# Patient Record
Sex: MALE | Race: ASIAN | Employment: FULL TIME | URBAN - METROPOLITAN AREA
[De-identification: names, ages, dates, MRNs, and addresses within clinical notes are randomized per-mention and may not be internally consistent; named-entity substitution may affect disease eponyms.]

---

## 2017-03-01 ENCOUNTER — OFFICE VISIT (OUTPATIENT)
Dept: FAMILY MEDICINE | Facility: CLINIC | Age: 47
End: 2017-03-01

## 2017-03-01 VITALS
BODY MASS INDEX: 25.16 KG/M2 | DIASTOLIC BLOOD PRESSURE: 90 MMHG | OXYGEN SATURATION: 98 % | HEART RATE: 88 BPM | WEIGHT: 147.4 LBS | SYSTOLIC BLOOD PRESSURE: 132 MMHG | TEMPERATURE: 98.2 F | HEIGHT: 64 IN

## 2017-03-01 DIAGNOSIS — R05.9 COUGH: Primary | ICD-10-CM

## 2017-03-01 PROCEDURE — 99213 OFFICE O/P EST LOW 20 MIN: CPT | Performed by: PHYSICIAN ASSISTANT

## 2017-03-01 RX ORDER — BENZONATATE 100 MG/1
100 CAPSULE ORAL 3 TIMES DAILY PRN
Qty: 21 CAPSULE | Refills: 0 | Status: SHIPPED | OUTPATIENT
Start: 2017-03-01 | End: 2017-03-20

## 2017-03-01 NOTE — NURSING NOTE
Son HEIKE Castro is here for cough and chest congestion. Stated that he travel back for [place] last night 02/28/2017 and the cough got worse. Also pt stated that he throat hurts when swallowing and always dry mouth.    Pre-Visit Screening :  Immunizations : up to date  Colon Screening : NA  Asthma Action Test/Plan : NA  PHQ9/GAD7 :  NA

## 2017-03-01 NOTE — PROGRESS NOTES
SUBJECTIVE:                                                    Son HEIKE Castro is a 47 year old male who presents to clinic today for the following health issues:    Son  is here today because of: Cough    Returned from [place] last night.    Onset of symptoms was 1   week ago.    The patient has had symptoms of:  Cough, headache and sore throat.     Patient denies fever, nausea, vomiting and diarrhea      24 hours of diarrhea while in [place].     Course of illness is worsening.    Patient denies exposure to illness at home or work/school.     Treatment measures tried include:   APAP, Nyquil,     Patient is not a smoker    Patient does not have a history of asthma    Other Contributing factors: None    ROS:  CV: NEGATIVE for chest pain or palpitations   GI: NEGATIVE for nausea, vomiting, abdominal pain, diarrhea or constipation  : negative for dysuria, hematuria        BP Readings from Last 3 Encounters:   03/01/17 132/90   04/08/15 130/80   02/25/14 138/88    Wt Readings from Last 3 Encounters:   03/01/17 66.9 kg (147 lb 6.4 oz)   04/08/15 67.9 kg (149 lb 12.8 oz)   02/25/14 67.1 kg (148 lb)                  Patient Active Problem List   Diagnosis     Calculus of kidney     Other specified urticaria     Allergic rhinitis     Hyperlipemia     CARDIOVASCULAR SCREENING; LDL GOAL LESS THAN 160     Health Care Home     Past Surgical History   Procedure Laterality Date     C repr cleft lip/nasal,secondary  1991       Social History   Substance Use Topics     Smoking status: Former Smoker     Years: 5.00     Quit date: 1/1/1990     Smokeless tobacco: Never Used     Alcohol use 0.5 oz/week     1 drink(s) per week      Comment: Maybe couple x per year     Family History   Problem Relation Age of Onset     C.A.D. No family hx of      DIABETES No family hx of      Hypertension Mother      CANCER No family hx of      CEREBROVASCULAR DISEASE No family hx of          Current Outpatient Prescriptions   Medication Sig Dispense  "Refill     benzonatate (TESSALON) 100 MG capsule Take 1 capsule (100 mg) by mouth 3 times daily as needed for cough 21 capsule 0     fluticasone (FLONASE) 50 MCG/ACT nasal spray SPRAY 2 SPRAYS INTO BOTH NOSTRILS DAILY 16 g 1     fexofenadine (ALLEGRA) 180 MG tablet Take 1 tablet (180 mg) by mouth daily 90 tablet 3     olopatadine (PATANOL) 0.1 % ophthalmic solution Apply 2 drops to eye daily 5 mL 11     Acetaminophen (TYLENOL 8 HOUR PO) Take  by mouth.       Allergies   Allergen Reactions     No Known Allergies        OBJECTIVE:                                                    /90 (BP Location: Right arm, Patient Position: Chair, Cuff Size: Adult Regular)  Pulse 88  Temp 98.2  F (36.8  C) (Oral)  Ht 1.626 m (5' 4\")  Wt 66.9 kg (147 lb 6.4 oz)  SpO2 98%  BMI 25.3 kg/m2   Body mass index is 25.3 kg/(m^2).     GENERAL: healthy, alert and no distress  EYES:Lids and Conjunctival normal, no discharge present bilaterally  EARS:   Right: CANAL and TM is normal: no effusions, no erythema, and normal landmarks  Left: CANAL and TM is normal: no effusions, no erythema, and normal landmarks  NOSE: normal  OROPHARYNX: normal, no tonsillar hypertrophy and no exudates present  NECK: normal, supple and no adenopathy  HEART: regular rate and rhythm; no murmurs, clicks, or gallops  LUNGS: CTA bilaterally  SKIN:Warm, Dry and No Rash           ASSESSMENT/PLAN:                                                          P:     1. Cough  - benzonatate (TESSALON) 100 MG capsule; Take 1 capsule (100 mg) by mouth 3 times daily as needed for cough  Dispense: 21 capsule; Refill: 0    Benzonatate prescribed. Take tid for 7 days.  AE include: constipation, dizziness, sleepiness, nasal congestion, upset stomach and headache.       Symptomatic cares advised including Increase fluids, rest, acetominophen or ibuprofen prn if not contraindicated.     If applicable advised the following:  Sore throat:  sugar free throat lozenges, sprays, " Chloraseptic lozenges, salt water gargles  Cough: sugar free cough drops, honey, Delsym bid.    Sinus: Warm packs on face, Vicks Vapo Rub, Sudafed (if HTN well controlled)      The patient is to RTC prn if these symptoms worsen or fail to improve as anticipated.       Svetlana Blackman PA-C  3/1/2017

## 2017-03-01 NOTE — MR AVS SNAPSHOT
"              After Visit Summary   3/1/2017    Tj Castro    MRN: [id number]           Patient Information     Date Of Birth          1970        Visit Information        Provider Department      3/1/2017 12:30 PM Svetlana Blackman PA [facility]        Today's Diagnoses     Cough    -  1       Follow-ups after your visit        Who to contact     If you have questions or need follow up information about today's clinic visit or your schedule please contact [facility] directly at [phone].  Normal or non-critical lab and imaging results will be communicated to you by Clickyreservahart, letter or phone within 4 business days after the clinic has received the results. If you do not hear from us within 7 days, please contact the clinic through Clickyreservahart or phone. If you have a critical or abnormal lab result, we will notify you by phone as soon as possible.  Submit refill requests through [employer] or call your pharmacy and they will forward the refill request to us. Please allow 3 business days for your refill to be completed.          Additional Information About Your Visit        MyChart Information     [employer]cert lets you send messages to your doctor, view your test results, renew your prescriptions, schedule appointments and more. To sign up, go to [email or web address] . Click on \"Log in\" on the left side of the screen, which will take you to the Welcome page. Then click on \"Sign up Now\" on the right side of the page.     You will be asked to enter the access code listed below, as well as some personal information. Please follow the directions to create your username and password.     Your access code is: [email or web address]  Expires: 2017  1:04 PM     Your access code will  in 90 days. If you need help or a new code, please call your [place] clinic or [phone].        Care EveryWhere ID     This is your Care EveryWhere ID. This could be used by other " "organizations to access your [place] medical records  [id number]        Your Vitals Were     Pulse Temperature Height Pulse Oximetry BMI (Body Mass Index)       88 98.2  F (36.8  C) (Oral) 1.626 m (5' 4\") 98% 25.3 kg/m2        Blood Pressure from Last 3 Encounters:   03/01/17 132/90   04/08/15 130/80   02/25/14 138/88    Weight from Last 3 Encounters:   03/01/17 66.9 kg (147 lb 6.4 oz)   04/08/15 67.9 kg (149 lb 12.8 oz)   02/25/14 67.1 kg (148 lb)              Today, you had the following     No orders found for display         Today's Medication Changes          These changes are accurate as of: 3/1/17  1:04 PM.  If you have any questions, ask your nurse or doctor.               Start taking these medicines.        Dose/Directions    benzonatate 100 MG capsule   Commonly known as:  TESSALON   Used for:  Cough   Started by:  Svetlana Blackman PA        Dose:  100 mg   Take 1 capsule (100 mg) by mouth 3 times daily as needed for cough   Quantity:  21 capsule   Refills:  0            Where to get your medicines      These medications were sent to [id number] - [place], [place] - [address] AT [place] [address]  [address] [place] [address]     Phone:  [phone]     benzonatate 100 MG capsule                Primary Care Provider Office Phone # Fax #    Willian Winters [phone]       [facility] [address] [facility] [address]  [facility] [address]        Thank you!     Thank you for choosing [facility]  for your care. Our goal is always to provide you with excellent care. Hearing back from our patients is one way we can continue to improve our services. Please take a few minutes to complete the written survey that you may receive in the mail after your visit with us. Thank you!             Your Updated Medication List - Protect others around you: Learn how to safely use, store and throw away your medicines at " www.disposemymeds.org.          This list is accurate as of: 3/1/17  1:04 PM.  Always use your most recent med list.                   Brand Name Dispense Instructions for use    benzonatate 100 MG capsule    TESSALON    21 capsule    Take 1 capsule (100 mg) by mouth 3 times daily as needed for cough       fexofenadine 180 MG tablet    ALLEGRA    90 tablet    Take 1 tablet (180 mg) by mouth daily       fluticasone 50 MCG/ACT spray    FLONASE    16 g    SPRAY 2 SPRAYS INTO BOTH NOSTRILS DAILY       olopatadine 0.1 % ophthalmic solution    PATANOL    5 mL    Apply 2 drops to eye daily       TYLENOL 8 HOUR PO      Take  by mouth.

## 2017-03-20 ENCOUNTER — OFFICE VISIT (OUTPATIENT)
Dept: FAMILY MEDICINE | Facility: CLINIC | Age: 47
End: 2017-03-20

## 2017-03-20 VITALS
OXYGEN SATURATION: 99 % | HEART RATE: 85 BPM | HEIGHT: 63 IN | DIASTOLIC BLOOD PRESSURE: 104 MMHG | BODY MASS INDEX: 25.69 KG/M2 | SYSTOLIC BLOOD PRESSURE: 130 MMHG | WEIGHT: 145 LBS | TEMPERATURE: 98.6 F | RESPIRATION RATE: 28 BRPM

## 2017-03-20 DIAGNOSIS — R05.9 COUGH: Primary | ICD-10-CM

## 2017-03-20 DIAGNOSIS — Z71.89 ACP (ADVANCE CARE PLANNING): ICD-10-CM

## 2017-03-20 PROCEDURE — 99213 OFFICE O/P EST LOW 20 MIN: CPT | Performed by: FAMILY MEDICINE

## 2017-03-20 RX ORDER — AZITHROMYCIN 250 MG/1
TABLET, FILM COATED ORAL
Qty: 6 TABLET | Refills: 0 | Status: SHIPPED | OUTPATIENT
Start: 2017-03-20 | End: 2017-05-22

## 2017-03-20 NOTE — PROGRESS NOTES
"SUBJECTIVE:   Son HEIKE Castro is a 47 year old male who complains of cough, productive cough and chest congestion for 21 days-started after return from trip to [place]. He denies a history of sweats, myalgias, shortness of breath and chest pain and denies a history of asthma. Patient does not smoke cigarettes.    Pt was seen here 3/1/17 and tried on tessalon-not resolving but not a lot worse     Pt taking nyquil    OBJECTIVE:BP (!) 130/104 (BP Location: Left arm, Patient Position: Chair, Cuff Size: Adult Large)  Pulse 85  Temp 98.6  F (37  C) (Oral)  Resp 28  Ht 1.607 m (5' 3.25\")  Wt 65.8 kg (145 lb)  SpO2 99%  BMI 25.48 kg/m2   He appears well, vital signs are as noted by the nurse. Ears normal.  Throat and pharynx normal.  Neck supple. No adenopathy in the neck. Nose is congested. Sinuses non tender. The chest is clear, without wheezes or rales.    ASSESSMENT:   Cough-now on week 4    PLAN:Zpack,  Discussed viral vs. bacterial infections and need to avoid unnecessary prescribing of antibiotics to ensure that no resistance will develop. Will give prescription for antibiotic (see orders) to fill is symptoms do not improve in the next 2-3 days.   Symptomatic therapy suggested: push fluids and rest. Call or return to clinic prn if these symptoms worsen or fail to improve as anticipated.   "

## 2017-05-22 ENCOUNTER — OFFICE VISIT (OUTPATIENT)
Dept: FAMILY MEDICINE | Facility: CLINIC | Age: 47
End: 2017-05-22

## 2017-05-22 VITALS
WEIGHT: 148.8 LBS | TEMPERATURE: 98.5 F | SYSTOLIC BLOOD PRESSURE: 132 MMHG | BODY MASS INDEX: 26.36 KG/M2 | HEIGHT: 63 IN | RESPIRATION RATE: 20 BRPM | HEART RATE: 90 BPM | OXYGEN SATURATION: 98 % | DIASTOLIC BLOOD PRESSURE: 98 MMHG

## 2017-05-22 DIAGNOSIS — R03.0 ELEVATED BLOOD-PRESSURE READING WITHOUT DIAGNOSIS OF HYPERTENSION: ICD-10-CM

## 2017-05-22 DIAGNOSIS — Z63.5 MARITAL DISRUPTION INVOLVING DIVORCE: ICD-10-CM

## 2017-05-22 DIAGNOSIS — R07.0 THROAT PAIN: Primary | ICD-10-CM

## 2017-05-22 DIAGNOSIS — J30.1 SEASONAL ALLERGIC RHINITIS DUE TO POLLEN: ICD-10-CM

## 2017-05-22 LAB — S PYO AG THROAT QL IA.RAPID: NORMAL

## 2017-05-22 PROCEDURE — 99213 OFFICE O/P EST LOW 20 MIN: CPT | Performed by: FAMILY MEDICINE

## 2017-05-22 PROCEDURE — 87880 STREP A ASSAY W/OPTIC: CPT | Performed by: FAMILY MEDICINE

## 2017-05-22 PROCEDURE — 87070 CULTURE OTHR SPECIMN AEROBIC: CPT | Performed by: FAMILY MEDICINE

## 2017-05-22 RX ORDER — FLUTICASONE PROPIONATE 50 MCG
SPRAY, SUSPENSION (ML) NASAL
Qty: 16 G | Refills: 3 | Status: SHIPPED | OUTPATIENT
Start: 2017-05-22 | End: 2018-03-21

## 2017-05-22 SDOH — SOCIAL STABILITY - SOCIAL INSECURITY: DISRUPTION OF FAMILY BY SEPARATION AND DIVORCE: Z63.5

## 2017-05-22 ASSESSMENT — ANXIETY QUESTIONNAIRES
GAD7 TOTAL SCORE: 2
6. BECOMING EASILY ANNOYED OR IRRITABLE: NOT AT ALL
1. FEELING NERVOUS, ANXIOUS, OR ON EDGE: NOT AT ALL
2. NOT BEING ABLE TO STOP OR CONTROL WORRYING: SEVERAL DAYS
5. BEING SO RESTLESS THAT IT IS HARD TO SIT STILL: NOT AT ALL
3. WORRYING TOO MUCH ABOUT DIFFERENT THINGS: SEVERAL DAYS
IF YOU CHECKED OFF ANY PROBLEMS ON THIS QUESTIONNAIRE, HOW DIFFICULT HAVE THESE PROBLEMS MADE IT FOR YOU TO DO YOUR WORK, TAKE CARE OF THINGS AT HOME, OR GET ALONG WITH OTHER PEOPLE: SOMEWHAT DIFFICULT
7. FEELING AFRAID AS IF SOMETHING AWFUL MIGHT HAPPEN: NOT AT ALL

## 2017-05-22 ASSESSMENT — PATIENT HEALTH QUESTIONNAIRE - PHQ9: 5. POOR APPETITE OR OVEREATING: NOT AT ALL

## 2017-05-22 NOTE — NURSING NOTE
Son HEIKE Castro is here today for URI symptoms: Sore Throat, Body Aches, Headaches, Sinus Pressure/Pain, and Ear Pain.    Pre-Visit Screening :  Immunizations : up to date  Colon Screening : NA  Asthma Action Test/Plan : NA  PHQ9/GAD7 :  Completed Today  Pulse - regular  My Chart - declines    CLASSIFICATION OF OVERWEIGHT AND OBESITY BY BMI                         Obesity Class           BMI(kg/m2)  Underweight                                    < 18.5  Normal                                         18.5-24.9  Overweight                                     25.0-29.9  OBESITY                     I                  30.0-34.9                              II                 35.0-39.9  EXTREME OBESITY             III                >40                             Patient's  BMI Body mass index is 26.36 kg/(m^2).  http://hin.nhlbi.nih.gov/menuplanner/menu.cgi  Questioned patient about current smoking habits.  Pt. quit smoking some time ago.  Rita Briscoe, [facility]

## 2017-05-22 NOTE — PROGRESS NOTES
SUBJECTIVE:   Son HEIKE Castro is a 47 year old male who complains of headache, sneezing, sore throat, earache, dry cough and myalgias for 14 days. He denies a history of productive cough, sweats, chills, wheezing, shortness of breath and chest pain and denies a history of asthma. Patient does not smoke cigarettes.    Pt checking BP at home-always normal but under stress related to divorce    Patient Active Problem List   Diagnosis     Calculus of kidney     Other specified urticaria     Allergic rhinitis     Hyperlipemia     CARDIOVASCULAR SCREENING; LDL GOAL LESS THAN 160     Health Care Home     ACP (advance care planning)     Past Medical History:   Diagnosis Date     Allergic rhinitis, cause unspecified     trees and pollens per testing     Hyperlipemia      Nephrolithiasis      Family History   Problem Relation Age of Onset     C.A.D. No family hx of      DIABETES No family hx of      Hypertension Mother      CANCER No family hx of      CEREBROVASCULAR DISEASE No family hx of      Social History     Social History     Marital status:      Spouse name: N/A     Number of children: N/A     Years of education: N/A     Occupational History     Maintenance [employer]     Social History Main Topics     Smoking status: Former Smoker     Years: 5.00     Quit date: 1/1/1990     Smokeless tobacco: Never Used     Alcohol use 0.5 oz/week     1 drink(s) per week      Comment: Maybe couple x per year     Drug use: No     Sexual activity: Yes     Partners: Female     Other Topics Concern     Occupational Exposure No     Exercise Yes     Social History Narrative     Past Surgical History:   Procedure Laterality Date     C REPR CLEFT LIP/NASAL,SECONDARY  1991       Current Outpatient Prescriptions on File Prior to Visit:  fexofenadine (ALLEGRA) 180 MG tablet Take 1 tablet (180 mg) by mouth daily   fluticasone (FLONASE) 50 MCG/ACT nasal spray SPRAY 2 SPRAYS INTO BOTH NOSTRILS DAILY   olopatadine (PATANOL) 0.1 % ophthalmic  "solution Apply 2 drops to eye daily   Acetaminophen (TYLENOL 8 HOUR PO) Take  by mouth.     No current facility-administered medications on file prior to visit.      Allergies: No known allergies    Immunization History   Administered Date(s) Administered     Hepatitis A Vac Ped/Adol-2 Dose 09/18/1999, 09/25/2007     OPV 09/18/1999     TD (ADULT, 7+) 09/18/1999     TDAP Vaccine (Adacel) 04/24/2013     Typhoid IM 09/18/1999     Typhoid Oral 09/25/2007         OBJECTIVE:BP (!) 132/98  Pulse 90  Temp 98.5  F (36.9  C) (Oral)  Resp 20  Ht 1.6 m (5' 3\")  Wt 67.5 kg (148 lb 12.8 oz)  SpO2 98%  BMI 26.36 kg/m2   He appears well, vital signs are as noted by the nurse. Ears normal.  Throat and pharynx normal.  Neck supple. No adenopathy in the neck. Nose is congested. Sinuses non tender. The chest is clear, without wheezes or rales.    ASSESSMENT:   Allergic rhinitis-has not been taking flonase    PLAN:restart flonase, continue allegra, I reviewed the risks, benefits, and possible side effects of the medication.  The patient had an opportunity to ask any questions regarding the treatment plan. The patient was encouraged to call my office if any problems.   Symptomatic therapy suggested: push fluids, rest and use acetaminophen, ibuprofen as needed. Call or return to clinic prn if these symptoms worsen or fail to improve as anticipated.     Check blood pressure readings outside of the clinic several times per week, write down values, and follow up if elevated within the next several weeks. Blood pressure can be checked at the firestation, drugstore,  or any valid site.   "

## 2017-05-22 NOTE — MR AVS SNAPSHOT
"              After Visit Summary   5/22/2017    Son HEIKE Castro    MRN: [id number]           Patient Information     Date Of Birth          1970        Visit Information        Provider Department      5/22/2017 3:45 PM Willian Winters MD [facility]        Today's Diagnoses     Throat pain    -  1    Elevated blood-pressure reading without diagnosis of hypertension        Marital disruption involving divorce        Seasonal allergic rhinitis due to pollen           Follow-ups after your visit        Who to contact     If you have questions or need follow up information about today's clinic visit or your schedule please contact [facility] directly at [phone].  Normal or non-critical lab and imaging results will be communicated to you by MyChart, letter or phone within 4 business days after the clinic has received the results. If you do not hear from us within 7 days, please contact the clinic through MyChart or phone. If you have a critical or abnormal lab result, we will notify you by phone as soon as possible.  Submit refill requests through "[employer] or call your pharmacy and they will forward the refill request to us. Please allow 3 business days for your refill to be completed.          Additional Information About Your Visit        Care EveryWhere ID     This is your Care EveryWhere ID. This could be used by other organizations to access your [place] medical records  [id number]        Your Vitals Were     Pulse Temperature Respirations Height Pulse Oximetry BMI (Body Mass Index)    90 98.5  F (36.9  C) (Oral) 20 1.6 m (5' 3\") 98% 26.36 kg/m2       Blood Pressure from Last 3 Encounters:   05/22/17 (!) 132/98   03/20/17 (!) 130/104   03/01/17 132/90    Weight from Last 3 Encounters:   05/22/17 67.5 kg (148 lb 12.8 oz)   03/20/17 65.8 kg (145 lb)   03/01/17 66.9 kg (147 lb 6.4 oz)              We Performed the Following     Rapid strep screen     " THROAT CULTURE (BFP)          Today's Medication Changes          These changes are accurate as of: 5/22/17  4:02 PM.  If you have any questions, ask your nurse or doctor.               These medicines have changed or have updated prescriptions.        Dose/Directions    fluticasone 50 MCG/ACT spray   Commonly known as:  FLONASE   This may have changed:  See the new instructions.   Used for:  Seasonal allergic rhinitis due to pollen   Changed by:  Willian Winters MD        SPRAY 2 SPRAYS INTO BOTH NOSTRILS DAILY   Quantity:  16 g   Refills:  3            Where to get your medicines      These medications were sent to [facility] [address]  [place], [place] - [address] AT [place] [address]  [address], [place] [address]     Phone:  [phone]     fluticasone 50 MCG/ACT spray                Primary Care Provider Office Phone # Fax #    Willian Winters [phone]       [facility] [address]  [facility] [address]        Thank you!     Thank you for choosing [facility]  for your care. Our goal is always to provide you with excellent care. Hearing back from our patients is one way we can continue to improve our services. Please take a few minutes to complete the written survey that you may receive in the mail after your visit with us. Thank you!             Your Updated Medication List - Protect others around you: Learn how to safely use, store and throw away your medicines at www.disposemymeds.org.          This list is accurate as of: 5/22/17  4:02 PM.  Always use your most recent med list.                   Brand Name Dispense Instructions for use    fexofenadine 180 MG tablet    ALLEGRA    90 tablet    Take 1 tablet (180 mg) by mouth daily       fluticasone 50 MCG/ACT spray    FLONASE    16 g    SPRAY 2 SPRAYS INTO BOTH NOSTRILS DAILY       olopatadine 0.1 % ophthalmic solution    PATANOL    5 mL     Apply 2 drops to eye daily       TYLENOL 8 HOUR PO      Take  by mouth.

## 2017-05-23 ASSESSMENT — ANXIETY QUESTIONNAIRES: GAD7 TOTAL SCORE: 2

## 2017-05-24 LAB — THROAT CULTURE: NORMAL

## 2018-01-17 ENCOUNTER — OFFICE VISIT (OUTPATIENT)
Dept: FAMILY MEDICINE | Facility: CLINIC | Age: 48
End: 2018-01-17

## 2018-01-17 VITALS
TEMPERATURE: 98.6 F | HEART RATE: 81 BPM | SYSTOLIC BLOOD PRESSURE: 142 MMHG | OXYGEN SATURATION: 100 % | BODY MASS INDEX: 26.82 KG/M2 | WEIGHT: 151.4 LBS | DIASTOLIC BLOOD PRESSURE: 90 MMHG

## 2018-01-17 DIAGNOSIS — Z71.1 NO ABNORMALITY DETECTED ON MENTAL HEALTH ASSESSMENT: Primary | ICD-10-CM

## 2018-01-17 PROCEDURE — 99213 OFFICE O/P EST LOW 20 MIN: CPT | Performed by: FAMILY MEDICINE

## 2018-01-17 NOTE — NURSING NOTE
Son is here for paperwork to get girlfriend here from [place].  BP elevated but he thinks it d/t him worrying about everything    Pre-visit Screening:  Immunizations:  up to date  Colonoscopy:  NA  Mammogram: NA  Asthma Action Test/Plan:  NA  PHQ9:  NA  GAD7:  NA  Questioned patient about current smoking habits Pt. quit smoking some time ago.  Ok to leave detailed message on voice mail for today's visit only Yes, phone # [phone]

## 2018-01-17 NOTE — PROGRESS NOTES
SUBJECTIVE:  Son HEIKE Castro, a 48 year old male scheduled an appointment to discuss the following issues:  Health Care Home  Pt here for form completion-he is getting remarried to a woman from his home country and the government requires a medical professional to attest to his mental health.  Pt has never been treated for or diagnosed with mental illness. He has not symptoms of depression or anxiety.    Medical, social, surgical, and family histories reviewed.    Patient Active Problem List   Diagnosis     Calculus of kidney     Other specified urticaria     Allergic rhinitis     Hyperlipemia     CARDIOVASCULAR SCREENING; LDL GOAL LESS THAN 160     Health Care Home     ACP (advance care planning)     Past Medical History:   Diagnosis Date     Allergic rhinitis, cause unspecified     trees and pollens per testing     Hyperlipemia      Nephrolithiasis      Family History   Problem Relation Age of Onset     C.A.D. No family hx of      DIABETES No family hx of      Hypertension Mother      CANCER No family hx of      CEREBROVASCULAR DISEASE No family hx of      Social History     Social History     Marital status:      Spouse name: N/A     Number of children: N/A     Years of education: N/A     Occupational History     Maintenance [employer]     Social History Main Topics     Smoking status: Former Smoker     Years: 5.00     Quit date: 1/1/1990     Smokeless tobacco: Never Used     Alcohol use 0.5 oz/week     1 drink(s) per week      Comment: Maybe couple x per year     Drug use: No     Sexual activity: Yes     Partners: Female     Other Topics Concern     Occupational Exposure No     Exercise Yes     Social History Narrative     Past Surgical History:   Procedure Laterality Date     C REPR CLEFT LIP/NASAL,SECONDARY  1991       Current Outpatient Prescriptions on File Prior to Visit:  fluticasone (FLONASE) 50 MCG/ACT spray SPRAY 2 SPRAYS INTO BOTH NOSTRILS DAILY   fexofenadine (ALLEGRA) 180 MG tablet Take 1 tablet  (180 mg) by mouth daily   olopatadine (PATANOL) 0.1 % ophthalmic solution Apply 2 drops to eye daily   Acetaminophen (TYLENOL 8 HOUR PO) Take  by mouth.     No current facility-administered medications on file prior to visit.      Allergies: No known allergies    Immunization History   Administered Date(s) Administered     HEPA 09/18/1999, 09/25/2007     OPV, trivalent, live 09/18/1999     TD (ADULT, 7+) 09/18/1999     TDAP Vaccine (Adacel) 04/24/2013     Typhoid IM 09/18/1999     Typhoid Oral 09/25/2007        ROS:  C: NEGATIVE for fever, chills  E: NEGATIVE for vision changes   R: NEGATIVE for significant cough or SOB  CV: NEGATIVE for chest pain, palpitations   GI: NEGATIVE for nausea, abdominal pain, heartburn, or change in bowel habits  : NEGATIVE for frequency, dysuria, or hematuria  M: NEGATIVE for significant arthralgias or myalgia  N: NEGATIVE for weakness, dizziness or paresthesias or headache    OBJECTIVE:  /90 (BP Location: Left arm, Patient Position: Chair, Cuff Size: Adult Large)  Pulse 81  Temp 98.6  F (37  C) (Oral)  Wt 68.7 kg (151 lb 6.4 oz)  SpO2 100%  BMI 26.82 kg/m2  EXAM:  GENERAL APPEARANCE: healthy, alert and no distress  PSYCH: mentation appears normal and affect normal/bright    ASSESSMENT/PLAN:  (Z71.1) No abnormality detected on mental health assessment  (primary encounter diagnosis)  Comment: forms completed and notarized  Plan:

## 2018-01-17 NOTE — MR AVS SNAPSHOT
"              After Visit Summary   2018    Tj Castro    MRN: [id number]           Patient Information     Date Of Birth          1970        Visit Information        Provider Department      2018 3:30 PM Willian Winters MD [facility]        Today's Diagnoses     No abnormality detected on mental health assessment    -  1       Follow-ups after your visit        Who to contact     If you have questions or need follow up information about today's clinic visit or your schedule please contact [facility] directly at [phone].  Normal or non-critical lab and imaging results will be communicated to you by [employer], letter or phone within 4 business days after the clinic has received the results. If you do not hear from us within 7 days, please contact the clinic through [employer] or phone. If you have a critical or abnormal lab result, we will notify you by phone as soon as possible.  Submit refill requests through [employer] or call your pharmacy and they will forward the refill request to us. Please allow 3 business days for your refill to be completed.          Additional Information About Your Visit        MyChart Information     [employer] lets you send messages to your doctor, view your test results, renew your prescriptions, schedule appointments and more. To sign up, go to [email or web address] . Click on \"Log in\" on the left side of the screen, which will take you to the Welcome page. Then click on \"Sign up Now\" on the right side of the page.     You will be asked to enter the access code listed below, as well as some personal information. Please follow the directions to create your username and password.     Your access code is: [email or web address]  Expires: 2018  4:11 PM     Your access code will  in 90 days. If you need help or a new code, please call your [facility] clinic or [phone].        Care EveryWhere ID     This is your " Care EveryWhere ID. This could be used by other organizations to access your [facility] medical records  [id number]        Your Vitals Were     Pulse Temperature Pulse Oximetry BMI (Body Mass Index)          81 98.6  F (37  C) (Oral) 100% 26.82 kg/m2         Blood Pressure from Last 3 Encounters:   01/17/18 142/90   05/22/17 (!) 132/98   03/20/17 (!) 130/104    Weight from Last 3 Encounters:   01/17/18 68.7 kg (151 lb 6.4 oz)   05/22/17 67.5 kg (148 lb 12.8 oz)   03/20/17 65.8 kg (145 lb)              Today, you had the following     No orders found for display       Primary Care Provider Office Phone # Fax #    Willian Winters [phone] [address] [facility] [address]  [facility] [address]        Equal Access to Services     ANTOLIN MCCOY : Hadii aad ku hadasho Soomaali, waaxda luqadaha, qaybta kaalmada adeegyada, waxay marisain haydeannn eda gallardo . So [facility] [phone].    ATENCIÓN: Si habla español, tiene a duff disposición servicios gratuitos de asistencia lingüística. Rui al [phone].    We comply with applicable federal civil rights laws and [place] laws. We do not discriminate on the basis of race, color, national origin, age, disability, sex, sexual orientation, or gender identity.            Thank you!     Thank you for choosing [facility]  for your care. Our goal is always to provide you with excellent care. Hearing back from our patients is one way we can continue to improve our services. Please take a few minutes to complete the written survey that you may receive in the mail after your visit with us. Thank you!             Your Updated Medication List - Protect others around you: Learn how to safely use, store and throw away your medicines at www.disposemymeds.org.          This list is accurate as of: 1/17/18  4:11 PM.  Always use your most recent med list.                   Brand Name Dispense Instructions for use Diagnosis     fexofenadine 180 MG tablet    ALLEGRA    90 tablet    Take 1 tablet (180 mg) by mouth daily    Allergic rhinitis, cause unspecified       fluticasone 50 MCG/ACT spray    FLONASE    16 g    SPRAY 2 SPRAYS INTO BOTH NOSTRILS DAILY    Seasonal allergic rhinitis due to pollen       olopatadine 0.1 % ophthalmic solution    PATANOL    5 mL    Apply 2 drops to eye daily    Allergic rhinitis, cause unspecified       TYLENOL 8 HOUR PO      Take  by mouth.

## 2018-03-05 ENCOUNTER — OFFICE VISIT (OUTPATIENT)
Dept: FAMILY MEDICINE | Facility: CLINIC | Age: 48
End: 2018-03-05

## 2018-03-05 VITALS
SYSTOLIC BLOOD PRESSURE: 136 MMHG | BODY MASS INDEX: 26.89 KG/M2 | OXYGEN SATURATION: 98 % | DIASTOLIC BLOOD PRESSURE: 96 MMHG | WEIGHT: 151.8 LBS | HEART RATE: 87 BPM | TEMPERATURE: 98.2 F

## 2018-03-05 DIAGNOSIS — R53.83 FATIGUE, UNSPECIFIED TYPE: ICD-10-CM

## 2018-03-05 DIAGNOSIS — R03.0 ELEVATED BLOOD PRESSURE READING WITHOUT DIAGNOSIS OF HYPERTENSION: ICD-10-CM

## 2018-03-05 DIAGNOSIS — M25.50 PAIN IN JOINT, MULTIPLE SITES: Primary | ICD-10-CM

## 2018-03-05 DIAGNOSIS — Z71.84 TRAVEL ADVICE ENCOUNTER: ICD-10-CM

## 2018-03-05 LAB
% GRANULOCYTES: 61.9 %
ERYTHROCYTE [SEDIMENTATION RATE] IN BLOOD: 5 MM/HR (ref 0–20)
HCT VFR BLD AUTO: 49.4 % (ref 40–53)
HEMOGLOBIN: 16.4 G/DL (ref 13.3–17.7)
LYMPHOCYTES NFR BLD AUTO: 28.8 %
MCH RBC QN AUTO: 26.1 PG (ref 26–33)
MCHC RBC AUTO-ENTMCNC: 33.2 G/DL (ref 31–36)
MCV RBC AUTO: 78.6 FL (ref 78–100)
MONOCYTES NFR BLD AUTO: 9.3 %
PLATELET COUNT - QUEST: 403 10^9/L (ref 150–375)
RBC # BLD AUTO: 6.28 10*12/L (ref 4.4–5.9)
WBC # BLD AUTO: 7.2 10*9/L (ref 4–11)

## 2018-03-05 PROCEDURE — 86431 RHEUMATOID FACTOR QUANT: CPT | Mod: 90 | Performed by: FAMILY MEDICINE

## 2018-03-05 PROCEDURE — 86618 LYME DISEASE ANTIBODY: CPT | Mod: 90 | Performed by: FAMILY MEDICINE

## 2018-03-05 PROCEDURE — 36415 COLL VENOUS BLD VENIPUNCTURE: CPT | Performed by: FAMILY MEDICINE

## 2018-03-05 PROCEDURE — 80050 GENERAL HEALTH PANEL: CPT | Mod: 90 | Performed by: FAMILY MEDICINE

## 2018-03-05 PROCEDURE — 86038 ANTINUCLEAR ANTIBODIES: CPT | Mod: 90 | Performed by: FAMILY MEDICINE

## 2018-03-05 PROCEDURE — 99214 OFFICE O/P EST MOD 30 MIN: CPT | Performed by: FAMILY MEDICINE

## 2018-03-05 PROCEDURE — 85651 RBC SED RATE NONAUTOMATED: CPT | Performed by: FAMILY MEDICINE

## 2018-03-05 RX ORDER — CIPROFLOXACIN 500 MG/1
500 TABLET, FILM COATED ORAL 2 TIMES DAILY
Qty: 6 TABLET | Refills: 0 | Status: SHIPPED | OUTPATIENT
Start: 2018-03-05 | End: 2018-03-21

## 2018-03-05 NOTE — LETTER
March 7, 2018      Tj Castro  [address] [place] [address]        Dear ,    We are writing to inform you of your test results.    Your test results fall within the expected range(s).  Looks good from labs!!    I think we may need to discuss mood treatments.  Please follow up with me if your symptoms persist.    Resulted Orders   CL AFF HEMOGRAM/PLATE/DIFF (BFP)   Result Value Ref Range    WBC 7.2 4.0 - 11 10*9/L      Comment:      ran twice ea    RBC Count 6.28 (A) 4.4 - 5.9 10*12/L    Hemoglobin 16.4 13.3 - 17.7 g/dL    Hematocrit 49.4 40.0 - 53.0 %    MCV 78.6 78 - 100 fL    MCH 26.1 26 - 33 pg    MCHC 33.2 31 - 36 g/dL    Platelet Count 403 (A) 150 - 375 10^9/L    % Granulocytes 61.9 %    % Lymphocytes 28.8 %    % Monocytes 9.3 %   ESR, WESTERGREN (BFP)   Result Value Ref Range    Sed Rate 5 0 - 20 mm/hr   COMPREHENSIVE METABOLIC PANEL ([facility]) XCMP   Result Value Ref Range    Glucose 93 65 - 99 mg/dL      Comment:                    Fasting reference interval         Urea Nitrogen 15 7 - 25 mg/dL    Creatinine 0.95 0.60 - 1.35 mg/dL    GFR Estimate 95 > OR = 60 mL/min/1.73m2    EGFR African American 110 > OR = 60 mL/min/1.73m2    BUN/Creatinine Ratio NOT APPLICABLE 6 - 22 (calc)    Sodium 138 135 - 146 mmol/L    Potassium 4.6 3.5 - 5.3 mmol/L    Chloride 101 98 - 110 mmol/L    Carbon Dioxide 29 20 - 31 mmol/L    Calcium 10.2 8.6 - 10.3 mg/dL    Protein Total 7.6 6.1 - 8.1 g/dL    Albumin 4.8 3.6 - 5.1 g/dL    Globulin Calculated 2.8 1.9 - 3.7 g/dL (calc)    A/G Ratio 1.7 1.0 - 2.5 (calc)    Bilirubin Total 0.4 0.2 - 1.2 mg/dL    Alkaline Phosphatase 72 40 - 115 U/L    AST 33 10 - 40 U/L    ALT 44 9 - 46 U/L   TSH with free T4 reflex ([facility])   Result Value Ref Range    TSH 2.02 0.40 - 4.50 mIU/L   Lymes Antibodies Total ([facility])   Result Value Ref Range    Lyme Screen IgG and IgM <0.90 index      Comment:                         Index                Interpretation                      -----                --------------                     < 0.90               Negative                     0.90-1.09            Equivocal                     > 1.09               Positive      As recommended by the Food and Drug Administration   (FDA), all samples with positive or equivocal   results in a Borrelia burgdorferi antibody screen  will be tested using a blot method. Positive or   equivocal screening test results should not be   interpreted as truly positive until verified as such   using a supplemental assay (e.g., B. burgdorferi blot).     The screening test and/or blot for B. burgdorferi   antibodies may be falsely negative in early stages  of Lyme disease, including the period when erythema   migrans is apparent.        RHEUMATOID FACTOR ([facility])   Result Value Ref Range    Rheumatoid Factor <14 <14 IU/mL   TAI Scrn Rflx to Titer and Ptrn ([facility])   Result Value Ref Range    TAI Screen NEGATIVE NEGATIVE      Comment:      TAI IFA is a first line screen for detecting the  presence of up to approximately 150 autoantibodies in  various autoimmune diseases. A negative TAI IFA result  suggests TAI-associated autoimmune diseases are not  present at this time.     Visit Physician FAQs for interpretation of all  antibodies in the [place], prevalence, and association  with diseases at [email or web address]/  faq/AFM965             If you have any questions or concerns, please call the clinic at the number listed above.       Sincerely,        Willian Winters MD

## 2018-03-05 NOTE — MR AVS SNAPSHOT
"              After Visit Summary   3/5/2018    Son HEIKE Castro    MRN: [id number]           Patient Information     Date Of Birth          1970        Visit Information        Provider Department      3/5/2018 3:00 PM Willian Winters MD [facility]        Today's Diagnoses     Pain in joint, multiple sites    -  1    Fatigue, unspecified type        Elevated blood pressure reading without diagnosis of hypertension        Travel advice encounter           Follow-ups after your visit        Who to contact     If you have questions or need follow up information about today's clinic visit or your schedule please contact [facility] directly at [phone].  Normal or non-critical lab and imaging results will be communicated to you by MyChart, letter or phone within 4 business days after the clinic has received the results. If you do not hear from us within 7 days, please contact the clinic through [employer] or phone. If you have a critical or abnormal lab result, we will notify you by phone as soon as possible.  Submit refill requests through [employer] or call your pharmacy and they will forward the refill request to us. Please allow 3 business days for your refill to be completed.          Additional Information About Your Visit        MyChart Information     [employer] lets you send messages to your doctor, view your test results, renew your prescriptions, schedule appointments and more. To sign up, go to [email or web address] . Click on \"Log in\" on the left side of the screen, which will take you to the Welcome page. Then click on \"Sign up Now\" on the right side of the page.     You will be asked to enter the access code listed below, as well as some personal information. Please follow the directions to create your username and password.     Your access code is: -[email or web address]  Expires: 2018  4:11 PM     Your access code will  in 90 days. If you need " help or a new code, please call your [facility] clinic or [phone].        Care EveryWhere ID     This is your Care EveryWhere ID. This could be used by other organizations to access your [facility] medical records  [id number]        Your Vitals Were     Pulse Temperature Pulse Oximetry BMI (Body Mass Index)          87 98.2  F (36.8  C) (Oral) 98% 26.89 kg/m2         Blood Pressure from Last 3 Encounters:   03/05/18 (!) 136/96   01/17/18 142/90   05/22/17 (!) 132/98    Weight from Last 3 Encounters:   03/05/18 68.9 kg (151 lb 12.8 oz)   01/17/18 68.7 kg (151 lb 6.4 oz)   05/22/17 67.5 kg (148 lb 12.8 oz)              We Performed the Following     TAI Scrn Rflx to Titer and Ptrn ([facility])     CL AFF HEMOGRAM/PLATE/DIFF (BFP)     COMPREHENSIVE METABOLIC PANEL ([facility]) XCMP     ESR, WESTERGREN (BFP)     Lymes Antibodies Total ([facility])     RHEUMATOID FACTOR ([facility])     TSH with free T4 reflex ([facility])     VENOUS COLLECTION          Today's Medication Changes          These changes are accurate as of 3/5/18  4:00 PM.  If you have any questions, ask your nurse or doctor.               Start taking these medicines.        Dose/Directions    ciprofloxacin 500 MG tablet   Commonly known as:  CIPRO   Used for:  Travel advice encounter   Started by:  Willian Winters MD        Dose:  500 mg   Take 1 tablet (500 mg) by mouth 2 times daily   Quantity:  6 tablet   Refills:  0            Where to get your medicines      These medications were sent to [id number] - [place], [place] - [address] AT [facility] [address]  [address]  [address], [place] [address]     Phone:  [phone]     ciprofloxacin 500 MG tablet                Primary Care Provider Office Phone # Fax #    Willian Winters [phone] [address] [facility] [address]  [facility] [address]        Equal Access to Services     [facility]: Hadii aad ku hadasho Soshar, waaxda luqadaha, qaybta kaalmatory huerta,  mert griffithmarquis cameron'aan ah. So [facility] [phone].    ATENCIÓN: Si daniel muro, tiene a duff disposición servicios gratuitos de asistencia lingüística. Rui cullen [phone].    We comply with applicable federal civil rights laws and [place] laws. We do not discriminate on the basis of race, color, national origin, age, disability, sex, sexual orientation, or gender identity.            Thank you!     Thank you for choosing [facility]  for your care. Our goal is always to provide you with excellent care. Hearing back from our patients is one way we can continue to improve our services. Please take a few minutes to complete the written survey that you may receive in the mail after your visit with us. Thank you!             Your Updated Medication List - Protect others around you: Learn how to safely use, store and throw away your medicines at www.disposemymeds.org.          This list is accurate as of 3/5/18  4:00 PM.  Always use your most recent med list.                   Brand Name Dispense Instructions for use Diagnosis    ciprofloxacin 500 MG tablet    CIPRO    6 tablet    Take 1 tablet (500 mg) by mouth 2 times daily    Travel advice encounter       fexofenadine 180 MG tablet    ALLEGRA    90 tablet    Take 1 tablet (180 mg) by mouth daily    Allergic rhinitis, cause unspecified       fluticasone 50 MCG/ACT spray    FLONASE    16 g    SPRAY 2 SPRAYS INTO BOTH NOSTRILS DAILY    Seasonal allergic rhinitis due to pollen       olopatadine 0.1 % ophthalmic solution    PATANOL    5 mL    Apply 2 drops to eye daily    Allergic rhinitis, cause unspecified       TYLENOL 8 HOUR PO      Take  by mouth.

## 2018-03-05 NOTE — PROGRESS NOTES
"SUBJECTIVE:  Son HEIKE Castro, a 48 year old male scheduled an appointment to discuss the following issues:     Pain in joint, multiple sites  Fatigue, unspecified type  Elevated blood pressure reading without diagnosis of hypertension  Travel advice encounter  Pt not feeling well for 2-3 months.  He states he is always fatigued, not sleeping well.  He also notes diffuse aches in joints including back, neck, hands, elbows, knees-feels \"puffy\" at times but no clear swelling.    He further relates a dry mouth.    He is heading to [place] in a month to bring back his [place] to [place]-wants to feel better before trip    He does relate some anxiousness about marriage and work issues    Patient Active Problem List   Diagnosis     Calculus of kidney     Other specified urticaria     Allergic rhinitis     Hyperlipemia     CARDIOVASCULAR SCREENING; LDL GOAL LESS THAN 160     Health Care Home     ACP (advance care planning)     Past Medical History:   Diagnosis Date     Allergic rhinitis, cause unspecified     trees and pollens per testing     Hyperlipemia      Nephrolithiasis      Family History   Problem Relation Age of Onset     C.A.D. No family hx of      DIABETES No family hx of      Hypertension Mother      CANCER No family hx of      CEREBROVASCULAR DISEASE No family hx of      Social History     Social History     Marital status:      Spouse name: N/A     Number of children: N/A     Years of education: N/A     Occupational History     Maintenance [employer]     Social History Main Topics     Smoking status: Former Smoker     Years: 5.00     Quit date: 1/1/1990     Smokeless tobacco: Never Used     Alcohol use 0.5 oz/week     1 drink(s) per week      Comment: Maybe couple x per year     Drug use: No     Sexual activity: Yes     Partners: Female     Other Topics Concern     Occupational Exposure No     Exercise Yes     Social History Narrative     Past Surgical History:   Procedure Laterality Date     C REPR CLEFT " LIP/NASAL,SECONDARY  1991       Current Outpatient Prescriptions on File Prior to Visit:  fluticasone (FLONASE) 50 MCG/ACT spray SPRAY 2 SPRAYS INTO BOTH NOSTRILS DAILY   fexofenadine (ALLEGRA) 180 MG tablet Take 1 tablet (180 mg) by mouth daily   olopatadine (PATANOL) 0.1 % ophthalmic solution Apply 2 drops to eye daily   Acetaminophen (TYLENOL 8 HOUR PO) Take  by mouth.     No current facility-administered medications on file prior to visit.      Allergies: No known allergies    Immunization History   Administered Date(s) Administered     HEPA 09/18/1999, 09/25/2007     OPV, trivalent, live 09/18/1999     TD (ADULT, 7+) 09/18/1999     TDAP Vaccine (Adacel) 04/24/2013     Typhoid IM 09/18/1999     Typhoid Oral 09/25/2007      Medical, social, surgical, and family histories reviewed.    ROS:  CONSTITUTIONAL: NEGATIVE for fever, chills  INTEGUMENTARY/SKIN: NEGATIVE for worrisome rashes, moles or lesions  EYES: NEGATIVE for vision changes   ENT/MOUTH: NEGATIVE for ear, mouth and throat problems  RESP: NEGATIVE for significant cough or SOB  CV: NEGATIVE for chest pain, palpitations   GI: NEGATIVE for nausea, abdominal pain, heartburn, or change in bowel habits  : NEGATIVE for frequency, dysuria, or hematuria  NEURO: NEGATIVE for weakness, dizziness or paresthesias or headache  ENDOCRINE: NEGATIVE for temperature intolerance, skin/hair changes  PSYCHIATRIC: NEGATIVE for changes in mood or affect    OBJECTIVE:  BP (!) 136/96  Pulse 87  Temp 98.2  F (36.8  C) (Oral)  Wt 68.9 kg (151 lb 12.8 oz)  SpO2 98%  BMI 26.89 kg/m2  EXAM:  GENERAL APPEARANCE: healthy, alert and no distress  EYES: EOMI,  PERRL  HENT: ear canals and TM's normal and nose and mouth without ulcers or lesions  NECK: no adenopathy, no asymmetry, masses, or scars and thyroid normal to palpation  RESP: lungs clear to auscultation - no rales, rhonchi or wheezes  CV: regular rates and rhythm, normal S1 S2, no S3 or S4 and no murmur, click or rub  -  ABDOMEN:  soft, nontender, no HSM or masses and bowel sounds normal  MS: extremities normal- no gross deformities noted, no evidence of inflammation in joints, FROM in all extremities.  SKIN: no suspicious lesions or rashes  NEURO: Normal strength and tone, sensory exam grossly normal, mentation intact and speech normal  PSYCH: mentation appears normal and affect normal/bright    ASSESSMENT/PLAN:  (M25.50) Pain in joint, multiple sites  (primary encounter diagnosis)  Comment: unclear etiology-need to look for signs inflammatory arthropathy  Plan: CL AFF HEMOGRAM/PLATE/DIFF (BFP), ESR,         WESTERGREN (BFP), COMPREHENSIVE METABOLIC PANEL        ([facility]) XCMP, TSH with free T4 reflex ([facility]),         Lymes Antibodies Total ([facility]), RHEUMATOID         FACTOR ([facility]), TAI Scrn Rflx to Titer and Ptrn        ([facility]), VENOUS COLLECTION        Await labs    (R53.83) Fatigue, unspecified type  Comment: differential diagnosis includes anemia, hypothyroidism, depression, sleep problems (apnea), chronic illness, low testosterone,  and medications   -given arthralgias do wonder about inflammatory disorder but mood and sleep issues also potential causes  Plan: CL AFF HEMOGRAM/PLATE/DIFF (BFP), ESR,         WESTERGREN (BFP), COMPREHENSIVE METABOLIC PANEL        ([facility]) XCMP, TSH with free T4 reflex ([facility]),         Lymes Antibodies Total ([facility]), RHEUMATOID         FACTOR ([facility]), TAI Scrn Rflx to Titer and Ptrn        ([facility]), VENOUS COLLECTION        Await labs, consider mood or sleep evals    (R03.0) Elevated blood pressure reading without diagnosis of hypertension  Comment: pt has been elevated 3 times now-likely meets HTN criteria-pt feels stress is causing this when he is here  Plan: Check blood pressure readings outside of the clinic several times per week, write down values, and follow up if elevated within the next several weeks. Blood pressure can be checked at the firestation, drugstore,  or any valid site.      Start meds next visit if still high    (Z71.89) Travel advice encounter  Comment: going to [place] , his home country, in a month-had bad diarrhea ;ast time  Plan: ciprofloxacin (CIPRO) 500 MG tablet        We agree to send with rx for travelrs diarrhes, discussed when to use

## 2018-03-06 LAB
ALBUMIN SERPL-MCNC: 4.8 G/DL (ref 3.6–5.1)
ALBUMIN/GLOB SERPL: 1.7 (CALC) (ref 1–2.5)
ALP SERPL-CCNC: 72 U/L (ref 40–115)
ALT SERPL-CCNC: 44 U/L (ref 9–46)
ANA SCREEN - QUEST: NEGATIVE
AST SERPL-CCNC: 33 U/L (ref 10–40)
BILIRUB SERPL-MCNC: 0.4 MG/DL (ref 0.2–1.2)
BUN SERPL-MCNC: 15 MG/DL (ref 7–25)
BUN/CREATININE RATIO: NORMAL (CALC) (ref 6–22)
CALCIUM SERPL-MCNC: 10.2 MG/DL (ref 8.6–10.3)
CHLORIDE SERPLBLD-SCNC: 101 MMOL/L (ref 98–110)
CO2 SERPL-SCNC: 29 MMOL/L (ref 20–31)
CREAT SERPL-MCNC: 0.95 MG/DL (ref 0.6–1.35)
EGFR AFRICAN AMERICAN - QUEST: 110 ML/MIN/1.73M2
GFR SERPL CREATININE-BSD FRML MDRD: 95 ML/MIN/1.73M2
GLOBULIN, CALCULATED - QUEST: 2.8 G/DL (CALC) (ref 1.9–3.7)
GLUCOSE - QUEST: 93 MG/DL (ref 65–99)
LYME SCREEN IGG AND IGM: <0.9 INDEX
POTASSIUM SERPL-SCNC: 4.6 MMOL/L (ref 3.5–5.3)
PROT SERPL-MCNC: 7.6 G/DL (ref 6.1–8.1)
RHEUMATOID FACT SER NEPH-ACNC: <14 IU/ML (ref 0–20)
SODIUM SERPL-SCNC: 138 MMOL/L (ref 135–146)
TSH SERPL-ACNC: 2.02 MIU/L (ref 0.4–4.5)

## 2018-03-21 ENCOUNTER — OFFICE VISIT (OUTPATIENT)
Dept: FAMILY MEDICINE | Facility: CLINIC | Age: 48
End: 2018-03-21

## 2018-03-21 VITALS
TEMPERATURE: 97.8 F | OXYGEN SATURATION: 96 % | BODY MASS INDEX: 27.32 KG/M2 | HEIGHT: 63 IN | HEART RATE: 83 BPM | DIASTOLIC BLOOD PRESSURE: 98 MMHG | SYSTOLIC BLOOD PRESSURE: 150 MMHG | WEIGHT: 154.2 LBS

## 2018-03-21 DIAGNOSIS — J30.1 CHRONIC SEASONAL ALLERGIC RHINITIS DUE TO POLLEN: ICD-10-CM

## 2018-03-21 DIAGNOSIS — R03.0 ELEVATED BLOOD PRESSURE READING WITHOUT DIAGNOSIS OF HYPERTENSION: ICD-10-CM

## 2018-03-21 DIAGNOSIS — F43.9 STRESS: Primary | ICD-10-CM

## 2018-03-21 PROCEDURE — 99213 OFFICE O/P EST LOW 20 MIN: CPT | Performed by: FAMILY MEDICINE

## 2018-03-21 RX ORDER — FLUTICASONE PROPIONATE 50 MCG
SPRAY, SUSPENSION (ML) NASAL
Qty: 16 G | Refills: 3 | Status: SHIPPED | OUTPATIENT
Start: 2018-03-21 | End: 2019-03-27

## 2018-03-21 ASSESSMENT — ANXIETY QUESTIONNAIRES
1. FEELING NERVOUS, ANXIOUS, OR ON EDGE: MORE THAN HALF THE DAYS
5. BEING SO RESTLESS THAT IT IS HARD TO SIT STILL: NOT AT ALL
7. FEELING AFRAID AS IF SOMETHING AWFUL MIGHT HAPPEN: NOT AT ALL
6. BECOMING EASILY ANNOYED OR IRRITABLE: NOT AT ALL
2. NOT BEING ABLE TO STOP OR CONTROL WORRYING: SEVERAL DAYS
IF YOU CHECKED OFF ANY PROBLEMS ON THIS QUESTIONNAIRE, HOW DIFFICULT HAVE THESE PROBLEMS MADE IT FOR YOU TO DO YOUR WORK, TAKE CARE OF THINGS AT HOME, OR GET ALONG WITH OTHER PEOPLE: SOMEWHAT DIFFICULT
3. WORRYING TOO MUCH ABOUT DIFFERENT THINGS: MORE THAN HALF THE DAYS
GAD7 TOTAL SCORE: 6

## 2018-03-21 ASSESSMENT — PATIENT HEALTH QUESTIONNAIRE - PHQ9: 5. POOR APPETITE OR OVEREATING: SEVERAL DAYS

## 2018-03-21 NOTE — MR AVS SNAPSHOT
"              After Visit Summary   3/21/2018    Tj Castro    MRN: [id number]           Patient Information     Date Of Birth          1970        Visit Information        Provider Department      3/21/2018 3:15 PM Willian Winters MD [facility]        Today's Diagnoses     Stress    -  1    Elevated blood pressure reading without diagnosis of hypertension        Chronic seasonal allergic rhinitis due to pollen           Follow-ups after your visit        Who to contact     If you have questions or need follow up information about today's clinic visit or your schedule please contact [facility] directly at [phone].  Normal or non-critical lab and imaging results will be communicated to you by Lessonwriterhart, letter or phone within 4 business days after the clinic has received the results. If you do not hear from us within 7 days, please contact the clinic through Lessonwriterhart or phone. If you have a critical or abnormal lab result, we will notify you by phone as soon as possible.  Submit refill requests through Magin or call your pharmacy and they will forward the refill request to us. Please allow 3 business days for your refill to be completed.          Additional Information About Your Visit        MyChart Information     Magin lets you send messages to your doctor, view your test results, renew your prescriptions, schedule appointments and more. To sign up, go to [email or web address] . Click on \"Log in\" on the left side of the screen, which will take you to the Welcome page. Then click on \"Sign up Now\" on the right side of the page.     You will be asked to enter the access code listed below, as well as some personal information. Please follow the directions to create your username and password.     Your access code is: -[email or web address]  Expires: 2018  5:11 PM     Your access code will  in 90 days. If you need help or a new code, please call " "your [place] clinic or [phone].        Care EveryWhere ID     This is your Care EveryWhere ID. This could be used by other organizations to access your [place] medical records  [id number]        Your Vitals Were     Pulse Temperature Height Pulse Oximetry BMI (Body Mass Index)       83 97.8  F (36.6  C) 1.588 m (5' 2.5\") 96% 27.75 kg/m2        Blood Pressure from Last 3 Encounters:   03/21/18 (!) 150/98   03/05/18 (!) 136/96   01/17/18 142/90    Weight from Last 3 Encounters:   03/21/18 69.9 kg (154 lb 3.2 oz)   03/05/18 68.9 kg (151 lb 12.8 oz)   01/17/18 68.7 kg (151 lb 6.4 oz)              Today, you had the following     No orders found for display         Today's Medication Changes          These changes are accurate as of 3/21/18  3:41 PM.  If you have any questions, ask your nurse or doctor.               Start taking these medicines.        Dose/Directions    sertraline 50 MG tablet   Commonly known as:  ZOLOFT   Used for:  Stress   Started by:  Willian Winters MD        Take 1/2 tablet (25 mg) for 1-2 weeks, then increase to 1 tablet orally daily   Quantity:  30 tablet   Refills:  0            Where to get your medicines      These medications were sent to [facility] - [place], [place] - [address]  [address], [place] [address]     Phone:  [phone]     fluticasone 50 MCG/ACT spray    sertraline 50 MG tablet                Primary Care Provider Office Phone # Fax #    Willian Winters [phone] [address] [facility] [address]  [facility] [address]        Equal Access to Services     [facility] NADINE AH: Lottie tristano Soshar, waaxda luqadaha, qaybta kaalmada adeegyada, mert cadet. So [facility] [phone].    ATENCIÓN: Si habla español, tiene a duff disposición servicios gratuitos de asistencia lingüística. Llame al [phone].    We comply with applicable federal civil rights " laws and [place] laws. We do not discriminate on the basis of race, color, national origin, age, disability, sex, sexual orientation, or gender identity.            Thank you!     Thank you for choosing [facility]  for your care. Our goal is always to provide you with excellent care. Hearing back from our patients is one way we can continue to improve our services. Please take a few minutes to complete the written survey that you may receive in the mail after your visit with us. Thank you!             Your Updated Medication List - Protect others around you: Learn how to safely use, store and throw away your medicines at www.disposemymeds.org.          This list is accurate as of 3/21/18  3:41 PM.  Always use your most recent med list.                   Brand Name Dispense Instructions for use Diagnosis    fexofenadine 180 MG tablet    ALLEGRA    90 tablet    Take 1 tablet (180 mg) by mouth daily    Allergic rhinitis, cause unspecified       fluticasone 50 MCG/ACT spray    FLONASE    16 g    SPRAY 2 SPRAYS INTO BOTH NOSTRILS DAILY    Chronic seasonal allergic rhinitis due to pollen       sertraline 50 MG tablet    ZOLOFT    30 tablet    Take 1/2 tablet (25 mg) for 1-2 weeks, then increase to 1 tablet orally daily    Stress       TYLENOL 8 HOUR PO      Take  by mouth.

## 2018-03-21 NOTE — PROGRESS NOTES
SUBJECTIVE:                                                    Son HEIKE Castro is a 48 year old male who presents to clinic today for the following health issues:      Abnormal Mood Symptoms  Onset: months     Description:   Depression: YES- slight  Anxiety: YES    Accompanying Signs & Symptoms:  Still participating in activities that you used to enjoy: sometimes  Fatigue: YES  Irritability: no  Difficulty concentrating: no  Changes in appetite: no  Problems with sleep: YES  Heart racing/beating fast : no  Thoughts of hurting yourself or others: none    History:   Recent stress: YES- going to [place] to  new wife, stress regarding divorce, financial stress, job stress  Prior depression hospitalization: None  Family history of depression: no  Family history of anxiety: no    Precipitating factors:   Alcohol/drug use: no    Alleviating factors:  none    Therapies Tried and outcome: None    Pt also states BP ok at home  130's/80  Problem list and histories reviewed & adjusted, as indicated.  Additional history: as documented    Patient Active Problem List   Diagnosis     Calculus of kidney     Other specified urticaria     Allergic rhinitis     Hyperlipemia     CARDIOVASCULAR SCREENING; LDL GOAL LESS THAN 160     Health Care Home     ACP (advance care planning)     Past Surgical History:   Procedure Laterality Date     C REPR CLEFT LIP/NASAL,SECONDARY  1991       Social History   Substance Use Topics     Smoking status: Former Smoker     Years: 5.00     Quit date: 1/1/1990     Smokeless tobacco: Never Used     Alcohol use 0.5 oz/week     1 drink(s) per week      Comment: Maybe couple x per year     Family History   Problem Relation Age of Onset     C.A.D. No family hx of      DIABETES No family hx of      Hypertension Mother      CANCER No family hx of      CEREBROVASCULAR DISEASE No family hx of          Current Outpatient Prescriptions   Medication Sig Dispense Refill     sertraline (ZOLOFT) 50 MG tablet Take 1/2  "tablet (25 mg) for 1-2 weeks, then increase to 1 tablet orally daily 30 tablet 0     fluticasone (FLONASE) 50 MCG/ACT spray SPRAY 2 SPRAYS INTO BOTH NOSTRILS DAILY 16 g 3     fexofenadine (ALLEGRA) 180 MG tablet Take 1 tablet (180 mg) by mouth daily 90 tablet 3     Acetaminophen (TYLENOL 8 HOUR PO) Take  by mouth.       Allergies   Allergen Reactions     No Known Allergies      Recent Labs   Lab Test  03/05/18   1522   ALT  44   CR  0.95   GFRESTIMATED  95   POTASSIUM  4.6   TSH  2.02      BP Readings from Last 3 Encounters:   03/21/18 (!) 150/98   03/05/18 (!) 136/96   01/17/18 142/90    Wt Readings from Last 3 Encounters:   03/21/18 69.9 kg (154 lb 3.2 oz)   03/05/18 68.9 kg (151 lb 12.8 oz)   01/17/18 68.7 kg (151 lb 6.4 oz)                    ROS:  Constitutional, HEENT, cardiovascular, pulmonary, gi and gu systems are negative, except as otherwise noted.    OBJECTIVE:     BP (!) 150/98 (BP Location: Right arm, Patient Position: Sitting, Cuff Size: Adult Large)  Pulse 83  Temp 97.8  F (36.6  C)  Ht 1.588 m (5' 2.5\")  Wt 69.9 kg (154 lb 3.2 oz)  SpO2 96%  BMI 27.75 kg/m2  Body mass index is 27.75 kg/(m^2).   GENERAL: healthy, alert and no distress  NECK: no adenopathy, no asymmetry, masses, or scars and thyroid normal to palpation  RESP: lungs clear to auscultation - no rales, rhonchi or wheezes  CV: regular rate and rhythm, normal S1 S2, no S3 or S4, no murmur, click or rub, no peripheral edema and peripheral pulses strong  ABDOMEN: soft, nontender, no hepatosplenomegaly, no masses and bowel sounds normal  MS: no gross musculoskeletal defects noted, no edema  PSYCH: mentation appears normal, affect normal/bright    Diagnostic Test Results:  none     ASSESSMENT:       PLAN:   (F43.9) Stress  (primary encounter diagnosis)  Comment: we discussed that his symptoms suggest mood issue-labs normal as noted last visit-suspect this is anxiety issue-discussed options  Plan: sertraline (ZOLOFT) 50 MG tablet         " We discussed the biochemical nature of mood disorders. Possible increased risk of suicidality with antidepressants discussed. No harm verbal contract agreed upon. I would like patient to follow up in 4-6 weeks after medication has had a chance to take effect.      We discussed the biochemical nature of mood disorders. Possible increased risk of suicidality with antidepressants discussed. No harm verbal contract agreed upon. I would like patient to follow up in 4-6 weeks after medication has had a chance to take effect.    (R03.0) Elevated blood pressure reading without diagnosis of hypertension  Comment: better at home, need to consider meds if stays high  Plan: recheck here 6 weeks        FUTURE APPOINTMENTS:       - Follow-up visit in 6- 8 weeks    Willian Winters MD  [facility]

## 2018-03-21 NOTE — NURSING NOTE
Son is here for follow up from his office visit on 3/5/18.     Pre-visit Screening:  Immunizations:  up to date  Colonoscopy:  NA   Mammogram: NA  Asthma Action Test/Plan:  NA  PHQ9:  NA  GAD7:  NA   Questioned patient about current smoking habits Pt. has never smoked.  Ok to leave detailed message on voice mail for today's visit only No, phone # Prefers to have correspondence mailed    Baylee Camp CMA

## 2018-03-22 ASSESSMENT — ANXIETY QUESTIONNAIRES: GAD7 TOTAL SCORE: 6

## 2018-10-08 ENCOUNTER — OFFICE VISIT (OUTPATIENT)
Dept: FAMILY MEDICINE | Facility: CLINIC | Age: 48
End: 2018-10-08

## 2018-10-08 VITALS
DIASTOLIC BLOOD PRESSURE: 98 MMHG | SYSTOLIC BLOOD PRESSURE: 148 MMHG | OXYGEN SATURATION: 98 % | BODY MASS INDEX: 27.93 KG/M2 | TEMPERATURE: 98.3 F | WEIGHT: 155.2 LBS | HEART RATE: 80 BPM

## 2018-10-08 DIAGNOSIS — J30.2 SEASONAL ALLERGIC RHINITIS, UNSPECIFIED TRIGGER: Primary | ICD-10-CM

## 2018-10-08 DIAGNOSIS — R03.0 ELEVATED BLOOD PRESSURE READING WITHOUT DIAGNOSIS OF HYPERTENSION: ICD-10-CM

## 2018-10-08 DIAGNOSIS — Z71.84 TRAVEL ADVICE ENCOUNTER: ICD-10-CM

## 2018-10-08 DIAGNOSIS — Z23 NEED FOR VACCINATION: ICD-10-CM

## 2018-10-08 DIAGNOSIS — F41.1 GAD (GENERALIZED ANXIETY DISORDER): ICD-10-CM

## 2018-10-08 PROCEDURE — 90686 IIV4 VACC NO PRSV 0.5 ML IM: CPT | Performed by: FAMILY MEDICINE

## 2018-10-08 PROCEDURE — 90471 IMMUNIZATION ADMIN: CPT | Performed by: FAMILY MEDICINE

## 2018-10-08 PROCEDURE — 99213 OFFICE O/P EST LOW 20 MIN: CPT | Mod: 25 | Performed by: FAMILY MEDICINE

## 2018-10-08 RX ORDER — CETIRIZINE HYDROCHLORIDE 10 MG/1
10 TABLET ORAL EVERY EVENING
Qty: 30 TABLET | Refills: 1 | Status: SHIPPED | OUTPATIENT
Start: 2018-10-08 | End: 2019-03-27

## 2018-10-08 RX ORDER — CIPROFLOXACIN 500 MG/1
500 TABLET, FILM COATED ORAL 2 TIMES DAILY
Qty: 6 TABLET | Refills: 0 | Status: SHIPPED | OUTPATIENT
Start: 2018-10-08 | End: 2019-03-27

## 2018-10-08 ASSESSMENT — ANXIETY QUESTIONNAIRES
3. WORRYING TOO MUCH ABOUT DIFFERENT THINGS: NEARLY EVERY DAY
IF YOU CHECKED OFF ANY PROBLEMS ON THIS QUESTIONNAIRE, HOW DIFFICULT HAVE THESE PROBLEMS MADE IT FOR YOU TO DO YOUR WORK, TAKE CARE OF THINGS AT HOME, OR GET ALONG WITH OTHER PEOPLE: NOT DIFFICULT AT ALL
5. BEING SO RESTLESS THAT IT IS HARD TO SIT STILL: SEVERAL DAYS
6. BECOMING EASILY ANNOYED OR IRRITABLE: SEVERAL DAYS
1. FEELING NERVOUS, ANXIOUS, OR ON EDGE: NEARLY EVERY DAY
7. FEELING AFRAID AS IF SOMETHING AWFUL MIGHT HAPPEN: SEVERAL DAYS
2. NOT BEING ABLE TO STOP OR CONTROL WORRYING: SEVERAL DAYS
GAD7 TOTAL SCORE: 11

## 2018-10-08 ASSESSMENT — PATIENT HEALTH QUESTIONNAIRE - PHQ9: 5. POOR APPETITE OR OVEREATING: SEVERAL DAYS

## 2018-10-08 NOTE — MR AVS SNAPSHOT
After Visit Summary   10/8/2018    Tj Castro    MRN: [id number]           Patient Information     Date Of Birth          1970        Visit Information        Provider Department      10/8/2018 2:30 PM Willian Winters MD [facility]        Today's Diagnoses     Seasonal allergic rhinitis, unspecified trigger    -  1    Elevated blood pressure reading without diagnosis of hypertension        ANSELMO (generalized anxiety disorder)        Travel advice encounter        Need for vaccination           Follow-ups after your visit        Who to contact     If you have questions or need follow up information about today's clinic visit or your schedule please contact [facility] directly at [phone].  Normal or non-critical lab and imaging results will be communicated to you by MyChart, letter or phone within 4 business days after the clinic has received the results. If you do not hear from us within 7 days, please contact the clinic through MyChart or phone. If you have a critical or abnormal lab result, we will notify you by phone as soon as possible.  Submit refill requests through [employer] or call your pharmacy and they will forward the refill request to us. Please allow 3 business days for your refill to be completed.          Additional Information About Your Visit        Care EveryWhere ID     This is your Care EveryWhere ID. This could be used by other organizations to access your [place] medical records  [id number]        Your Vitals Were     Pulse Temperature Pulse Oximetry BMI (Body Mass Index)          80 98.3  F (36.8  C) (Oral) 98% 27.93 kg/m2         Blood Pressure from Last 3 Encounters:   10/08/18 (!) 148/98   03/21/18 (!) 150/98   03/05/18 (!) 136/96    Weight from Last 3 Encounters:   10/08/18 70.4 kg (155 lb 3.2 oz)   03/21/18 69.9 kg (154 lb 3.2 oz)   03/05/18 68.9 kg (151 lb 12.8 oz)              We Performed the Following      HC FLU VAC PRESRV FREE QUAD SPLIT VIR 3+YRS IM     VACCINE ADMINISTRATION, INITIAL          Today's Medication Changes          These changes are accurate as of 10/8/18  3:10 PM.  If you have any questions, ask your nurse or doctor.               Start taking these medicines.        Dose/Directions    cetirizine 10 MG tablet   Commonly known as:  zyrTEC   Used for:  Seasonal allergic rhinitis, unspecified trigger   Started by:  Willian Winters MD        Dose:  10 mg   Take 1 tablet (10 mg) by mouth every evening   Quantity:  30 tablet   Refills:  1       ciprofloxacin 500 MG tablet   Commonly known as:  CIPRO   Used for:  Travel advice encounter   Started by:  Willian Winters MD        Dose:  500 mg   Take 1 tablet (500 mg) by mouth 2 times daily   Quantity:  6 tablet   Refills:  0            Where to get your medicines      These medications were sent to [id number] - [place], [place] - [address] AT [place] [address]  [address], [place] [address]     Phone:  [phone]     cetirizine 10 MG tablet    ciprofloxacin 500 MG tablet                Primary Care Provider Office Phone # Fax #    Willian Winters [phone] [address] [facility] [address]  [facility] [address]        Equal Access to Services     ANTOLIN MCCOY AH: Hadii aad ku hadasho Soomaali, waaxda luqadaha, qaybta kaalmada adeegyada, waxay idiin hayaan eda khmartínez lalucille ah. So [facility] [phone].    ATENCIÓN: Si habla español, tiene a duff disposición servicios gratuitos de asistencia lingüística. ame al [phone].    We comply with applicable federal civil rights laws and [place] laws. We do not discriminate on the basis of race, color, national origin, age, disability, sex, sexual orientation, or gender identity.            Thank you!     Thank you for choosing [facility]  for your care. Our goal is always to provide you with excellent care.  Hearing back from our patients is one way we can continue to improve our services. Please take a few minutes to complete the written survey that you may receive in the mail after your visit with us. Thank you!             Your Updated Medication List - Protect others around you: Learn how to safely use, store and throw away your medicines at www.disposemymeds.org.          This list is accurate as of 10/8/18  3:10 PM.  Always use your most recent med list.                   Brand Name Dispense Instructions for use Diagnosis    cetirizine 10 MG tablet    zyrTEC    30 tablet    Take 1 tablet (10 mg) by mouth every evening    Seasonal allergic rhinitis, unspecified trigger       ciprofloxacin 500 MG tablet    CIPRO    6 tablet    Take 1 tablet (500 mg) by mouth 2 times daily    Travel advice encounter       fexofenadine 180 MG tablet    ALLEGRA    90 tablet    Take 1 tablet (180 mg) by mouth daily    Allergic rhinitis, cause unspecified       fluticasone 50 MCG/ACT spray    FLONASE    16 g    SPRAY 2 SPRAYS INTO BOTH NOSTRILS DAILY    Chronic seasonal allergic rhinitis due to pollen       sertraline 50 MG tablet    ZOLOFT    30 tablet    Take 1/2 tablet (25 mg) for 1-2 weeks, then increase to 1 tablet orally daily    Stress       TYLENOL 8 HOUR PO      Take  by mouth.

## 2018-10-08 NOTE — NURSING NOTE
Son is here because he has not been feeling well for a month--- tired, some chest pain, perhaps allergies        Pre-visit Screening:  Immunizations:  up to date  Colonoscopy:  NA  Mammogram: NA  Asthma Action Test/Plan:  NA  PHQ9:  none  GAD7:  Done today  Questioned patient about current smoking habits Pt. quit smoking some time ago.  Ok to leave detailed message on voice mail for today's visit only Yes, phone # [phone]

## 2018-10-08 NOTE — PROGRESS NOTES
SUBJECTIVE:   Son HEIKE Castro is a 48 year old male who complains of mild sore throat, mild earache, mild cough and fatigue for many days-seems to come and go. He denies a history of productive cough, sweats, chills, myalgias, wheezing and shortness of breath and denies a history of asthma. Patient does not smoke cigarettes.     OBJECTIVE:BP (!) 148/98 (BP Location: Left arm, Patient Position: Sitting, Cuff Size: Adult Large)  Pulse 80  Temp 98.3  F (36.8  C) (Oral)  Wt 70.4 kg (155 lb 3.2 oz)  SpO2 98%  BMI 27.93 kg/m2   He appears well, vital signs are as noted by the nurse. Ears normal.  Throat and pharynx normal.  Neck supple. No adenopathy in the neck. Nose is congested. Sinuses non tender. The chest is clear, without wheezes or rales.    ASSESSMENT:   Allergic rhinitis -already on flonase, will add cetirizine, I reviewed the risks, benefits, and possible side effects of the medication.  The patient had an opportunity to ask any questions regarding the treatment plan. The patient was encouraged to call my office if any problems.     PLAN:  Symptomatic therapy suggested: push fluids and rest. Call or return to clinic prn if these symptoms worsen or fail to improve as anticipated.       (R03.0) Elevated blood pressure reading without diagnosis of hypertension  Comment: pt checks at home and always 120-130/80  Plan: Check blood pressure readings outside of the clinic several times per week, write down values, and follow up if elevated within the next several weeks. Blood pressure can be checked at the firestation, drugstore,  or any valid site.     (F41.1) ANSELMO (generalized anxiety disorder)  Comment: pt ANSELMO elevatedm he stopped sertraline after he went home to his [place] and remar[place]-felt this helped his anxiety regarding divorce greatly  Plan: consider restart sertraline if anxiety symptoms persist-he declines for now    (Z71.89) Travel advice encounter  Comment: going back to home again in  [place]  Plan: ciprofloxacin (CIPRO) 500 MG tablet            (Z23) Need for vaccination  Comment:   Plan: HC FLU VAC PRESRV FREE QUAD SPLIT VIR 3+YRS IM,        VACCINE ADMINISTRATION, INITIAL

## 2018-10-09 ASSESSMENT — ANXIETY QUESTIONNAIRES: GAD7 TOTAL SCORE: 11

## 2019-03-27 ENCOUNTER — OFFICE VISIT (OUTPATIENT)
Dept: FAMILY MEDICINE | Facility: CLINIC | Age: 49
End: 2019-03-27

## 2019-03-27 VITALS
OXYGEN SATURATION: 96 % | BODY MASS INDEX: 27.18 KG/M2 | HEART RATE: 103 BPM | HEIGHT: 63 IN | TEMPERATURE: 99.4 F | RESPIRATION RATE: 16 BRPM | DIASTOLIC BLOOD PRESSURE: 90 MMHG | WEIGHT: 153.4 LBS | SYSTOLIC BLOOD PRESSURE: 142 MMHG

## 2019-03-27 DIAGNOSIS — R03.0 ELEVATED BLOOD PRESSURE READING WITHOUT DIAGNOSIS OF HYPERTENSION: ICD-10-CM

## 2019-03-27 DIAGNOSIS — J30.1 CHRONIC SEASONAL ALLERGIC RHINITIS DUE TO POLLEN: ICD-10-CM

## 2019-03-27 DIAGNOSIS — J06.9 UPPER RESPIRATORY TRACT INFECTION, UNSPECIFIED TYPE: Primary | ICD-10-CM

## 2019-03-27 DIAGNOSIS — R05.9 COUGH: ICD-10-CM

## 2019-03-27 PROCEDURE — 99213 OFFICE O/P EST LOW 20 MIN: CPT | Performed by: FAMILY MEDICINE

## 2019-03-27 RX ORDER — CETIRIZINE HYDROCHLORIDE 10 MG/1
10 TABLET ORAL EVERY EVENING
Qty: 90 TABLET | Refills: 1 | Status: SHIPPED | OUTPATIENT
Start: 2019-03-27 | End: 2021-12-06

## 2019-03-27 RX ORDER — BENZONATATE 100 MG/1
100 CAPSULE ORAL 3 TIMES DAILY PRN
Qty: 21 CAPSULE | Refills: 0 | Status: SHIPPED | OUTPATIENT
Start: 2019-03-27 | End: 2020-11-19

## 2019-03-27 RX ORDER — FLUTICASONE PROPIONATE 50 MCG
SPRAY, SUSPENSION (ML) NASAL
Qty: 16 G | Refills: 11 | Status: SHIPPED | OUTPATIENT
Start: 2019-03-27 | End: 2021-12-06

## 2019-03-27 RX ORDER — GUAIFENESIN 600 MG/1
1200 TABLET, EXTENDED RELEASE ORAL 2 TIMES DAILY
Qty: 40 TABLET | Refills: 0 | Status: SHIPPED | OUTPATIENT
Start: 2019-03-27 | End: 2020-11-19

## 2019-03-27 SDOH — ECONOMIC STABILITY: FOOD INSECURITY: WITHIN THE PAST 12 MONTHS, THE FOOD YOU BOUGHT JUST DIDN'T LAST AND YOU DIDN'T HAVE MONEY TO GET MORE.: NEVER TRUE

## 2019-03-27 SDOH — ECONOMIC STABILITY: TRANSPORTATION INSECURITY
IN THE PAST 12 MONTHS, HAS THE LACK OF TRANSPORTATION KEPT YOU FROM MEDICAL APPOINTMENTS OR FROM GETTING MEDICATIONS?: NO

## 2019-03-27 SDOH — ECONOMIC STABILITY: TRANSPORTATION INSECURITY
IN THE PAST 12 MONTHS, HAS LACK OF TRANSPORTATION KEPT YOU FROM MEETINGS, WORK, OR FROM GETTING THINGS NEEDED FOR DAILY LIVING?: NO

## 2019-03-27 SDOH — ECONOMIC STABILITY: INCOME INSECURITY: HOW HARD IS IT FOR YOU TO PAY FOR THE VERY BASICS LIKE FOOD, HOUSING, MEDICAL CARE, AND HEATING?: NOT HARD AT ALL

## 2019-03-27 SDOH — ECONOMIC STABILITY: FOOD INSECURITY: WITHIN THE PAST 12 MONTHS, YOU WORRIED THAT YOUR FOOD WOULD RUN OUT BEFORE YOU GOT MONEY TO BUY MORE.: NEVER TRUE

## 2019-03-27 ASSESSMENT — MIFFLIN-ST. JEOR: SCORE: 1460.95

## 2019-03-27 NOTE — PATIENT INSTRUCTIONS
Upper respiratory tract infection, unspecified type  (primary encounter diagnosis)  Plan: guaiFENesin (MUCINEX) 600 MG 12 hr tablet        Symptomatic care with decongestants, fluids, tylenol/advil prn. Use GUAIFENESIN  MG OR TBCR, 1 tab po BID (Twice per day), D: 20, R: 0 for congestion and cough.    In addition, I have suggested that the patient   monitor for symptoms of bacterial infection expecting slow gradual resolution of viral URI as the natural course.      (R05) Cough  Plan: guaiFENesin (MUCINEX) 600 MG 12 hr tablet,         benzonatate (TESSALON) 100 MG capsule          Monitor BP      (J30.1) Chronic seasonal allergic rhinitis due to pollen  Plan: fluticasone (FLONASE) 50 MCG/ACT nasal spray,         cetirizine (ZYRTEC) 10 MG tablet        Refilled. Potential medication side effects were discussed with the patient; let me know if any occur.

## 2019-03-27 NOTE — PROGRESS NOTES
SUBJECTIVE: 49 year old male complaining of body aches and chills with sore throat followed by nasal congestion and cough for 3 day(s).   The patient describes using Dayquil and tylenol along with allergy medications. Struggles with alelrgies year around and requests refill of his medications.  The patient denies a history of fever, GI symptoms or pain.   Smoking history: No.   Relevant past medical history: chronic rhinitis with spring and fall falres. Always has an elevation in BP at the office. Cuff at home always comes down to <140/<90    OBJECTIVE: The patient appears healthy, alert, no distress, cooperative, smiling and fatigued.   EARS: negative  NOSE/SINUS: positive findings: mucosa erythematous and swollen, clear rhinorrhea   THROAT: normal and post nasal drainage   NECK:Neck supple. No adenopathy. Thyroid symmetric, normal size,, Carotids without bruits.   CHEST: Clear with dry cough    ASSESSMENT:(J06.9) Upper respiratory tract infection, unspecified type  (primary encounter diagnosis)  Plan: guaiFENesin (MUCINEX) 600 MG 12 hr tablet        Symptomatic care with decongestants, fluids, tylenol/advil prn. Use GUAIFENESIN  MG OR TBCR, 1 tab po BID (Twice per day), D: 20, R: 0 for congestion and cough.    In addition, I have suggested that the patient   monitor for symptoms of bacterial infection expecting slow gradual resolution of viral URI as the natural course.      (R05) Cough  Plan: guaiFENesin (MUCINEX) 600 MG 12 hr tablet,         benzonatate (TESSALON) 100 MG capsule          Monitor BP      (J30.1) Chronic seasonal allergic rhinitis due to pollen  Plan: fluticasone (FLONASE) 50 MCG/ACT nasal spray,         cetirizine (ZYRTEC) 10 MG tablet        Refilled. Potential medication side effects were discussed with the patient; let me know if any occur.

## 2020-11-16 ENCOUNTER — TELEPHONE (OUTPATIENT)
Dept: FAMILY MEDICINE | Facility: CLINIC | Age: 50
End: 2020-11-16

## 2020-11-16 NOTE — TELEPHONE ENCOUNTER
Ok thanks. Sending to  to reach out to pt to schedule since it's been over a year.   05-Feb-2019 06:20

## 2020-11-16 NOTE — TELEPHONE ENCOUNTER
Son called with the following symptoms for a month.    -chest heaviness  -ear pain that comes and goes  -sore throat that comes and goes, mainly in the am when he wakes up.  -not had a fever, checks every am for work.    He wants to know if he can be seen for his ongoing symptoms.  Pt hasnt been seen since March 2019. If needed can he do a virtual visit if it is not safe for pt to come in?      Routing to Dr. Winters to advise. Thanks.      Son's cell # [phone]

## 2020-11-19 ENCOUNTER — OFFICE VISIT (OUTPATIENT)
Dept: FAMILY MEDICINE | Facility: CLINIC | Age: 50
End: 2020-11-19

## 2020-11-19 VITALS
RESPIRATION RATE: 20 BRPM | TEMPERATURE: 97.3 F | BODY MASS INDEX: 27.29 KG/M2 | SYSTOLIC BLOOD PRESSURE: 174 MMHG | HEART RATE: 120 BPM | WEIGHT: 154 LBS | DIASTOLIC BLOOD PRESSURE: 112 MMHG | HEIGHT: 63 IN

## 2020-11-19 DIAGNOSIS — R03.0 ELEVATED BLOOD PRESSURE READING WITHOUT DIAGNOSIS OF HYPERTENSION: ICD-10-CM

## 2020-11-19 DIAGNOSIS — M79.10 MYALGIA: Primary | ICD-10-CM

## 2020-11-19 DIAGNOSIS — K21.9 GASTROESOPHAGEAL REFLUX DISEASE WITHOUT ESOPHAGITIS: ICD-10-CM

## 2020-11-19 PROCEDURE — G2023 SPECIMEN COLLECT COVID-19: HCPCS | Performed by: FAMILY MEDICINE

## 2020-11-19 PROCEDURE — 99213 OFFICE O/P EST LOW 20 MIN: CPT | Performed by: FAMILY MEDICINE

## 2020-11-19 ASSESSMENT — MIFFLIN-ST. JEOR: SCORE: 1458.67

## 2020-11-19 NOTE — PROGRESS NOTES
"SUBJECTIVE:   Son HEIKE Castro is a 50 year old male who complains of post nasal drip, clear nasal discharge, mild sore throat and myalgias for 6 days. He denies a history of productive cough, sweats, chills and shortness of breath and denies a history of asthma. Patient does not smoke cigarettes.    Pt denies chest pain or shortness of breath but slight chest \"heaviness\"-better today-really only an issue Monday-pt does feel he might be having some heartburn    Pt not exercising.    He worries a lot about covid, wife new to this country    Pt also had flu shot 9/4- felt achy and ST afterward, took allergy meds-allegra , fluticasone, advil, tylenol, nyquil-felt better after 3 weeks-normal until last 6 days as noted    Pt checks BP at home-was 130/70 yesterday-does feel anxious today    Patient Active Problem List   Diagnosis     Calculus of kidney     Other specified urticaria     Allergic rhinitis     Hyperlipemia     CARDIOVASCULAR SCREENING; LDL GOAL LESS THAN 160     Health Care Home     ACP (advance care planning)     ANSELMO (generalized anxiety disorder)     Elevated blood pressure reading without diagnosis of hypertension     Past Medical History:   Diagnosis Date     Allergic rhinitis, cause unspecified     trees and pollens per testing     Hyperlipemia      Nephrolithiasis      Family History   Problem Relation Age of Onset     Hypertension Mother      C.A.D. No family hx of      Diabetes No family hx of      Cancer No family hx of      Cerebrovascular Disease No family hx of      Social History     Socioeconomic History     Marital status:      Spouse name: Not on file     Number of children: Not on file     Years of education: Not on file     Highest education level: Not on file   Occupational History     Occupation: Maintenance     Employer: [employer]   Social Needs     Financial resource strain: Not hard at all     Food insecurity     Worry: Never true     Inability: Never true     Transportation needs    "  Medical: No     Non-medical: No   Tobacco Use     Smoking status: Former Smoker     Years: 5.00     Quit date: 1990     Years since quittin.9     Smokeless tobacco: Never Used   Substance and Sexual Activity     Alcohol use: Yes     Alcohol/week: 0.8 standard drinks     Types: 1 drink(s) per week     Comment: Maybe couple x per year     Drug use: No     Sexual activity: Yes     Partners: Female   Lifestyle     Physical activity     Days per week: Not on file     Minutes per session: Not on file     Stress: Not on file   Relationships     Social connections     Talks on phone: Not on file     Gets together: Not on file     Attends Latter day service: Not on file     Active member of club or organization: Not on file     Attends meetings of clubs or organizations: Not on file     Relationship status: Not on file     Intimate partner violence     Fear of current or ex partner: Not on file     Emotionally abused: Not on file     Physically abused: Not on file     Forced sexual activity: Not on file   Other Topics Concern      Service Not Asked     Blood Transfusions Not Asked     Caffeine Concern Not Asked     Occupational Exposure No     Hobby Hazards Not Asked     Sleep Concern Not Asked     Stress Concern Not Asked     Weight Concern Not Asked     Special Diet Not Asked     Back Care Not Asked     Exercise Yes     Bike Helmet Not Asked     Seat Belt Not Asked     Self-Exams Not Asked   Social History Narrative     Not on file     Past Surgical History:   Procedure Laterality Date     C REPR CLEFT LIP/NASAL,SECONDARY            Acetaminophen (TYLENOL 8 HOUR PO), Take  by mouth.       cetirizine (ZYRTEC) 10 MG tablet, Take 1 tablet (10 mg) by mouth every evening       fluticasone (FLONASE) 50 MCG/ACT nasal spray, SPRAY 2 SPRAYS INTO BOTH NOSTRILS DAILY    No current facility-administered medications on file prior to visit.        Allergies: No known allergies    Immunization History   Administered  "Date(s) Administered     HEPA 09/18/1999, 09/25/2007     Influenza Vaccine IM > 6 months Valent IIV4 10/08/2018, 10/20/2020     OPV, trivalent, live 09/18/1999     TD (ADULT, 7+) 09/18/1999     TDAP Vaccine (Adacel) 04/24/2013     Typhoid IM 09/18/1999     Typhoid Oral 09/25/2007         OBJECTIVE:BP (!) 174/112 (BP Location: Left arm, Patient Position: Chair, Cuff Size: Adult Regular)   Pulse 120   Temp 97.3  F (36.3  C)   Resp 20   Ht 1.6 m (5' 3\")   Wt 69.9 kg (154 lb)   BMI 27.28 kg/m     He appears well, vital signs are as noted by the nurse. Ears normal.  Throat and pharynx normal.  Neck supple. No adenopathy in the neck. Nose is congested. Sinuses non tender. The chest is clear, without wheezes or rales.  S1 and S2 normal, no murmurs, clicks, gallops or rubs. Regular rate and rhythm. Chest is clear; no wheezes or rales. No edema or JVD.     ASSESSMENT:   (M79.10) Myalgia  (primary encounter diagnosis)  Comment: likely viral illness, minimal symptoms today, we agree it reasonable to check covid today but low likelihood positive  Plan: SARS CoV2 CoVid 19 Qual  ([facility])            (R03.0) Elevated blood pressure reading without diagnosis of hypertension  Comment: normal as recently as yesterday at home, Check blood pressure readings outside of the clinic several times per week, write down values, and follow up if elevated within the next several weeks. Blood pressure can be checked at the firestation, drugstore,  or any valid site.   Plan:     (K21.9) Gastroesophageal reflux disease without esophagitis  Comment: suspect mild chest pressure related to GERD  Plan: trial OTC omeprazole for 2 weeks, f/u if not improving or worsening     patient given instructions to go to emergency department immediately if worsening of symptoms and verbalizes this understanding      Symptomatic therapy suggested: push fluids and rest. Call or return to clinic prn if these symptoms worsen or fail to improve as anticipated.   "

## 2020-11-19 NOTE — NURSING NOTE
Son HEIKE Castro is here for possible allergies. Has had some congestion in chest and nose.     Questioned patient about current smoking habits.  Pt. quit smoking some time ago.  PULSE regular  My Chart:   CLASSIFICATION OF OVERWEIGHT AND OBESITY BY BMI                        Obesity Class           BMI(kg/m2)  Underweight                                    < 18.5  Normal                                         18.5-24.9  Overweight                                     25.0-29.9  OBESITY                     I                  30.0-34.9                             II                 35.0-39.9  EXTREME OBESITY             III                >40                            Patient's  BMI Body mass index is 27.28 kg/m .  http://hin.nhlbi.nih.gov/menuplanner/menu.cgi  Pre-visit planning  Immunizations - up to date  Colonoscopy -   Mammogram -   Asthma -   PHQ9 -    ANSELMO-7 -

## 2020-11-24 LAB — SARS-COV-2 RNA SPEC QL NAA+PROBE: NOT DETECTED

## 2020-11-25 ENCOUNTER — TELEPHONE (OUTPATIENT)
Dept: FAMILY MEDICINE | Facility: CLINIC | Age: 50
End: 2020-11-25

## 2021-10-25 NOTE — NURSING NOTE
Son HEIKE Castro is here today for a cough that he has had since being in [place] in February, saw [facility] on 03/01/17, given Tessalon Pearls and still coughing.    Pre-Visit Screening :  Immunizations : up to date  Colon Screening : NA  Asthma Action Test/Plan : NA  PHQ9/GAD7 :  NA  Pulse - regular  My Chart - accepts    CLASSIFICATION OF OVERWEIGHT AND OBESITY BY BMI                         Obesity Class           BMI(kg/m2)  Underweight                                    < 18.5  Normal                                         18.5-24.9  Overweight                                     25.0-29.9  OBESITY                     I                  30.0-34.9                              II                 35.0-39.9  EXTREME OBESITY             III                >40                             Patient's  BMI Body mass index is 25.48 kg/(m^2).  http://hin.nhlbi.nih.gov/menuplanner/menu.cgi  Questioned patient about current smoking habits.  Pt. quit smoking some time ago.  Rita Briscoe, CMA              
patient

## 2021-12-06 ENCOUNTER — OFFICE VISIT (OUTPATIENT)
Dept: FAMILY MEDICINE | Facility: CLINIC | Age: 51
End: 2021-12-06

## 2021-12-06 VITALS
DIASTOLIC BLOOD PRESSURE: 84 MMHG | HEART RATE: 97 BPM | OXYGEN SATURATION: 97 % | BODY MASS INDEX: 27.64 KG/M2 | WEIGHT: 156 LBS | SYSTOLIC BLOOD PRESSURE: 136 MMHG | HEIGHT: 63 IN | TEMPERATURE: 98.1 F

## 2021-12-06 DIAGNOSIS — R05.9 COUGH: Primary | ICD-10-CM

## 2021-12-06 DIAGNOSIS — U07.1 INFECTION DUE TO 2019 NOVEL CORONAVIRUS: ICD-10-CM

## 2021-12-06 DIAGNOSIS — J30.1 CHRONIC SEASONAL ALLERGIC RHINITIS DUE TO POLLEN: ICD-10-CM

## 2021-12-06 LAB — COVID-19: POSITIVE

## 2021-12-06 PROCEDURE — 87635 SARS-COV-2 COVID-19 AMP PRB: CPT | Performed by: FAMILY MEDICINE

## 2021-12-06 PROCEDURE — 99214 OFFICE O/P EST MOD 30 MIN: CPT | Performed by: FAMILY MEDICINE

## 2021-12-06 RX ORDER — FLUTICASONE PROPIONATE 50 MCG
SPRAY, SUSPENSION (ML) NASAL
Qty: 16 G | Refills: 11 | Status: SHIPPED | OUTPATIENT
Start: 2021-12-06

## 2021-12-06 ASSESSMENT — MIFFLIN-ST. JEOR: SCORE: 1462.74

## 2021-12-06 NOTE — PROGRESS NOTES
Assessment & Plan   Problem List Items Addressed This Visit        Infectious/Inflammatory    Infection due to 2019 novel coronavirus      Other Visit Diagnoses     Cough    -  Primary    Relevant Medications    fluticasone (FLONASE) 50 MCG/ACT nasal spray    Other Relevant Orders    COVID-19 ([facility]) (Completed)    Chronic seasonal allergic rhinitis due to pollen        Relevant Medications    fluticasone (FLONASE) 50 MCG/ACT nasal spray           If you have been tested for COVID-19 or have concerns about it:    Stay home and away from others:    If possible, stay away from others, especially people who are at  higher risk for getting very sick from COVID-19, such as older  adults and people with other medical conditions.    If you have been in contact with someone with COVID-19, stay home   and away from others for 14 days after your last contact with that person.   Follow the recommendations of your local public health  department if you need to quarantine.    If you have a fever, cough or other symptoms of COVID-19, stay home   and away from others (except to get medical care).    Monitor your health:    Watch for fever, cough, shortness of breath, or other  symptoms of COVID-19.    Remember, symptoms may appear 2-14 days after exposure  to COVID-19 and can include:      Fever or chills    Cough    Shortness of breath or  difficulty breathing    Tiredness    Muscle or body aches    Headache    New loss of taste or  smell    Sore throat    Congestion or runny  nose    Nausea or vomiting    Diarrhea    Call your clinic if your symptoms change or worsen.  You can also reference up to date information on the following websites regarding COVID-19:    https://www.cdc.gov/coronavirus/2019-ncov/index.html  https://[place].gov[place]covid19/    1. Cough  Continue to treat symptoms, followup as needed.  - COVID-19 ([facility])    2. Chronic seasonal allergic rhinitis due to pollen  Refilled.  - fluticasone (FLONASE) 50 MCG/ACT nasal spray;  "SPRAY 2 SPRAYS INTO BOTH NOSTRILS DAILY  Dispense: 16 g; Refill: 11    3. Infection due to 2019 novel coronavirus  Positive for covid. Continue to treat symptoms. Follow cdc guidelines for self isolation.  Check [facility] website for monoclonal antibody treatment.    Results for orders placed or performed in visit on 12/06/21   COVID-19 ([facility])     Status: Abnormal   Result Value Ref Range    COVID-19 Positive (A)        C       FUTURE APPOINTMENTS:       - Follow-up visit as needed.    No follow-ups on file.    Candy Hampton MD  [facility]    Subjective     Nursing Notes:   Mague Pruett [facility]  12/6/2021 12:57 PM  Signed  Chief Complaint   Patient presents with     Covid Concern     started 8 days ago, fatigue, body aches, cough, congestion, head and chest hurt with cough, feels sweaty/clammy, trouble sleeping      Pre-visit Screening:  Immunizations:  up to date  Colonoscopy:  is up to date  Mammogram: NA  Asthma Action Test/Plan:  NA  PHQ9:  NA  GAD7:  NA  Questioned patient about current smoking habits Pt. quit smoking some time ago.  Ok to leave detailed message on voice mail for today's visit only Yes, phone # [phone]           Son HEIKE Castro is a 51 year old male who presents to clinic today for the following health issues   HPI     Has been vaccinated for covid.    8 days of coughing, body aches, chills. Has a fever. Sweats. Has a sore throat, feeling tired, eyes hurt. No known covid exposure.   Has a 3 mo old baby and has been staying in the basement.  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        Review of Systems   Constitutional, HEENT, cardiovascular, pulmonary, gi and gu systems are negative, except as otherwise noted.      Objective    /84 (BP Location: Right arm, Patient Position: Sitting, Cuff Size: Adult Large)   Pulse 97   Temp 98.1  F (36.7  C) (Temporal)   Ht 1.6 m (5' 3\")   Wt 70.8 kg (156 lb)   SpO2 97%   BMI 27.63 kg/m    Body mass " index is 27.63 kg/m .  Physical Exam   GENERAL: healthy, alert and no distress  EYES: Eyes grossly normal to inspection, PERRL and conjunctivae and sclerae normal  HENT:nose and mouth without ulcers or lesions  RESP: lungs clear to auscultation - no rales, rhonchi or wheezes  CV: regular rate and rhythm, normal S1 S2, no S3 or S4, no murmur, click or rub, no peripheral edema and peripheral pulses strong  MS: no gross musculoskeletal defects noted, no edema  NEURO: Normal strength and tone, mentation intact and speech normal  PSYCH: mentation appears normal, affect normal/bright    Results for orders placed or performed in visit on 12/06/21   COVID-19 ([facility])     Status: Abnormal   Result Value Ref Range    COVID-19 Positive (A)

## 2021-12-06 NOTE — PATIENT INSTRUCTIONS
Assessment & Plan   Problem List Items Addressed This Visit        Infectious/Inflammatory    Infection due to 2019 novel coronavirus      Other Visit Diagnoses     Cough    -  Primary    Relevant Medications    fluticasone (FLONASE) 50 MCG/ACT nasal spray    Other Relevant Orders    COVID-19 ([facility]) (Completed)    Chronic seasonal allergic rhinitis due to pollen        Relevant Medications    fluticasone (FLONASE) 50 MCG/ACT nasal spray           If you have been tested for COVID-19 or have concerns about it:    Stay home and away from others:    If possible, stay away from others, especially people who are at  higher risk for getting very sick from COVID-19, such as older  adults and people with other medical conditions.    If you have been in contact with someone with COVID-19, stay home   and away from others for 14 days after your last contact with that person.   Follow the recommendations of your local public health  department if you need to quarantine.    If you have a fever, cough or other symptoms of COVID-19, stay home   and away from others (except to get medical care).    Monitor your health:    Watch for fever, cough, shortness of breath, or other  symptoms of COVID-19.    Remember, symptoms may appear 2-14 days after exposure  to COVID-19 and can include:      Fever or chills    Cough    Shortness of breath or  difficulty breathing    Tiredness    Muscle or body aches    Headache    New loss of taste or  smell    Sore throat    Congestion or runny  nose    Nausea or vomiting    Diarrhea    Call your clinic if your symptoms change or worsen.  You can also reference up to date information on the following websites regarding COVID-19:    https://www.cdc.gov/coronavirus/2019-ncov/index.html  https://[place].gov/covid19/    1. Cough  Continue to treat symptoms, followup as needed.  - COVID-19 ([facility])    2. Chronic seasonal allergic rhinitis due to pollen  Refilled.  - fluticasone (FLONASE) 50 MCG/ACT nasal spray;  SPRAY 2 SPRAYS INTO BOTH NOSTRILS DAILY  Dispense: 16 g; Refill: 11    3. Infection due to 2019 novel coronavirus  Positive for covid. Continue to treat symptoms. Follow cdc guidelines for self isolation.  Check [facility] website for monoclonal antibody treatment.    Results for orders placed or performed in visit on 12/06/21   COVID-19 ([facility])     Status: Abnormal   Result Value Ref Range    COVID-19 Positive (A)        C       FUTURE APPOINTMENTS:       - Follow-up visit as needed.    No follow-ups on file.    Candy Hampton MD  [facility]

## 2021-12-06 NOTE — NURSING NOTE
Chief Complaint   Patient presents with     Covid Concern     started 8 days ago, fatigue, body aches, cough, congestion, head and chest hurt with cough, feels sweaty/clammy, trouble sleeping      Pre-visit Screening:  Immunizations:  up to date  Colonoscopy:  is up to date  Mammogram: NA  Asthma Action Test/Plan:  NA  PHQ9:  NA  GAD7:  NA  Questioned patient about current smoking habits Pt. quit smoking some time ago.  Ok to leave detailed message on voice mail for today's visit only Yes, phone # [phone]

## 2022-03-28 NOTE — NURSING NOTE
Son is here today for a URI.    Pre-visit Screening:  Immunizations:  up to date  Colonoscopy:  NA  Mammogram: NA  Asthma Action Test/Plan:  NA  PHQ9:  NA  GAD7:  NA  Questioned patient about current smoking habits Pt. quit smoking some time ago.  Ok to leave detailed message on voice mail for today's visit only Yes, phone # [phone]       Yes

## 2025-02-19 NOTE — MR AVS SNAPSHOT
"              After Visit Summary   3/20/2017    Tj Castro    MRN: [id number]           Patient Information     Date Of Birth          1970        Visit Information        Provider Department      3/20/2017 3:30 PM Willian Winters MD [facility]        Today's Diagnoses     Cough    -  1    ACP (advance care planning)           Follow-ups after your visit        Your next 10 appointments already scheduled     Mar 20, 2017  3:30 PM CDT   Office Visit with Willian Winters MD   [facility] ([facility])    [address]  [address]  [facility] [id number] [phone]              Who to contact     If you have questions or need follow up information about today's clinic visit or your schedule please contact [facility] directly at [phone].  Normal or non-critical lab and imaging results will be communicated to you by MyChart, letter or phone within 4 business days after the clinic has received the results. If you do not hear from us within 7 days, please contact the clinic through MyChart or phone. If you have a critical or abnormal lab result, we will notify you by phone as soon as possible.  Submit refill requests through [employer] or call your pharmacy and they will forward the refill request to us. Please allow 3 business days for your refill to be completed.          Additional Information About Your Visit        MyChart Information     [employer] lets you send messages to your doctor, view your test results, renew your prescriptions, schedule appointments and more. To sign up, go to [email or web address] . Click on \"Log in\" on the left side of the screen, which will take you to the Welcome page. Then click on \"Sign up Now\" on the right side of the page.     You will be asked to enter the access code listed below, as well as some personal information. Please follow the directions to " "create your username and password.     Your access code is: [email or web address]  Expires: 2017  2:04 PM     Your access code will  in 90 days. If you need help or a new code, please call your [facility] or [phone].        Care EveryWhere ID     This is your Care EveryWhere ID. This could be used by other organizations to access your [facility] medical records  [id number]        Your Vitals Were     Pulse Temperature Respirations Height Pulse Oximetry BMI (Body Mass Index)    85 98.6  F (37  C) (Oral) 28 1.607 m (5' 3.25\") 99% 25.48 kg/m2       Blood Pressure from Last 3 Encounters:   17 (!) 130/104   17 132/90   04/08/15 130/80    Weight from Last 3 Encounters:   17 65.8 kg (145 lb)   17 66.9 kg (147 lb 6.4 oz)   04/08/15 67.9 kg (149 lb 12.8 oz)              Today, you had the following     No orders found for display         Today's Medication Changes          These changes are accurate as of: 3/20/17  3:23 PM.  If you have any questions, ask your nurse or doctor.               Start taking these medicines.        Dose/Directions    azithromycin 250 MG tablet   Commonly known as:  ZITHROMAX   Used for:  Cough   Started by:  Willian Winters MD        Two tablets first day, then one tablet daily for four days.   Quantity:  6 tablet   Refills:  0            Where to get your medicines      These medications were sent to [id number] - [place], [place] - [address] AT [facility] [address] [place] [place] [address]     Phone:  [phone]     azithromycin 250 MG tablet                Primary Care Provider Office Phone # Fax #    Willian Winters [phone]       [facility] [address] [facility] [address]  [facility] [address]        Thank you!     Thank you for choosing [facility]  for your care. Our goal is always to provide you with excellent care. Hearing back from our patients " is one way we can continue to improve our services. Please take a few minutes to complete the written survey that you may receive in the mail after your visit with us. Thank you!             Your Updated Medication List - Protect others around you: Learn how to safely use, store and throw away your medicines at www.disposemymeds.org.          This list is accurate as of: 3/20/17  3:23 PM.  Always use your most recent med list.                   Brand Name Dispense Instructions for use    azithromycin 250 MG tablet    ZITHROMAX    6 tablet    Two tablets first day, then one tablet daily for four days.       fexofenadine 180 MG tablet    ALLEGRA    90 tablet    Take 1 tablet (180 mg) by mouth daily       fluticasone 50 MCG/ACT spray    FLONASE    16 g    SPRAY 2 SPRAYS INTO BOTH NOSTRILS DAILY       olopatadine 0.1 % ophthalmic solution    PATANOL    5 mL    Apply 2 drops to eye daily       TYLENOL 8 HOUR PO      Take  by mouth.          Epidural